# Patient Record
Sex: FEMALE | Race: WHITE | Employment: FULL TIME | URBAN - METROPOLITAN AREA
[De-identification: names, ages, dates, MRNs, and addresses within clinical notes are randomized per-mention and may not be internally consistent; named-entity substitution may affect disease eponyms.]

---

## 2017-01-27 RX ORDER — VENLAFAXINE HYDROCHLORIDE 37.5 MG/1
CAPSULE, EXTENDED RELEASE ORAL
Qty: 30 CAPSULE | Refills: 0 | Status: SHIPPED | OUTPATIENT
Start: 2017-01-27 | End: 2017-02-26 | Stop reason: SDUPTHER

## 2017-02-26 RX ORDER — VENLAFAXINE HYDROCHLORIDE 37.5 MG/1
CAPSULE, EXTENDED RELEASE ORAL
Qty: 30 CAPSULE | Refills: 0 | Status: SHIPPED | OUTPATIENT
Start: 2017-02-26 | End: 2017-07-26 | Stop reason: SDUPTHER

## 2017-02-26 RX ORDER — VENLAFAXINE HYDROCHLORIDE 37.5 MG/1
CAPSULE, EXTENDED RELEASE ORAL
Qty: 30 CAPSULE | Refills: 0 | Status: SHIPPED | OUTPATIENT
Start: 2017-02-26 | End: 2017-07-26 | Stop reason: ALTCHOICE

## 2017-04-12 RX ORDER — VENLAFAXINE HYDROCHLORIDE 37.5 MG/1
CAPSULE, EXTENDED RELEASE ORAL
Qty: 30 CAPSULE | Refills: 2 | Status: SHIPPED | OUTPATIENT
Start: 2017-04-12 | End: 2017-07-26 | Stop reason: ALTCHOICE

## 2017-07-24 ENCOUNTER — HOSPITAL ENCOUNTER (OUTPATIENT)
Dept: WOMENS IMAGING | Age: 52
Discharge: HOME | End: 2017-07-24
Attending: FAMILY MEDICINE | Admitting: FAMILY MEDICINE

## 2017-07-24 DIAGNOSIS — Z12.31 VISIT FOR SCREENING MAMMOGRAM: ICD-10-CM

## 2017-07-26 ENCOUNTER — OFFICE VISIT (OUTPATIENT)
Dept: FAMILY MEDICINE CLINIC | Age: 52
End: 2017-07-26

## 2017-07-26 VITALS
WEIGHT: 172 LBS | BODY MASS INDEX: 24.08 KG/M2 | HEIGHT: 71 IN | SYSTOLIC BLOOD PRESSURE: 118 MMHG | DIASTOLIC BLOOD PRESSURE: 72 MMHG

## 2017-07-26 DIAGNOSIS — Z82.61 FAMILY HISTORY OF RHEUMATOID ARTHRITIS: ICD-10-CM

## 2017-07-26 DIAGNOSIS — R53.83 FATIGUE, UNSPECIFIED TYPE: ICD-10-CM

## 2017-07-26 DIAGNOSIS — M13.0 POLYARTHRITIS: Primary | ICD-10-CM

## 2017-07-26 LAB
A/G RATIO: 1.8 (ref 1.1–2.2)
ALBUMIN SERPL-MCNC: 4.4 G/DL (ref 3.4–5)
ALP BLD-CCNC: 71 U/L (ref 40–129)
ALT SERPL-CCNC: 10 U/L (ref 10–40)
ANION GAP SERPL CALCULATED.3IONS-SCNC: 13 MMOL/L (ref 3–16)
AST SERPL-CCNC: 14 U/L (ref 15–37)
BILIRUB SERPL-MCNC: 0.6 MG/DL (ref 0–1)
BUN BLDV-MCNC: 13 MG/DL (ref 7–20)
CALCIUM SERPL-MCNC: 9.4 MG/DL (ref 8.3–10.6)
CHLORIDE BLD-SCNC: 102 MMOL/L (ref 99–110)
CO2: 26 MMOL/L (ref 21–32)
CREAT SERPL-MCNC: 0.6 MG/DL (ref 0.6–1.1)
GFR AFRICAN AMERICAN: >60
GFR NON-AFRICAN AMERICAN: >60
GLOBULIN: 2.4 G/DL
GLUCOSE BLD-MCNC: 85 MG/DL (ref 70–99)
HCT VFR BLD CALC: 41.2 % (ref 36–48)
HEMOGLOBIN: 14.2 G/DL (ref 12–16)
MCH RBC QN AUTO: 30.9 PG (ref 26–34)
MCHC RBC AUTO-ENTMCNC: 34.5 G/DL (ref 31–36)
MCV RBC AUTO: 89.7 FL (ref 80–100)
PDW BLD-RTO: 12.7 % (ref 12.4–15.4)
PLATELET # BLD: 193 K/UL (ref 135–450)
PMV BLD AUTO: 9.3 FL (ref 5–10.5)
POTASSIUM SERPL-SCNC: 4.4 MMOL/L (ref 3.5–5.1)
RBC # BLD: 4.59 M/UL (ref 4–5.2)
RHEUMATOID FACTOR: 41 IU/ML
SEDIMENTATION RATE, ERYTHROCYTE: 10 MM/HR (ref 0–30)
SODIUM BLD-SCNC: 141 MMOL/L (ref 136–145)
TOTAL PROTEIN: 6.8 G/DL (ref 6.4–8.2)
TSH SERPL DL<=0.05 MIU/L-ACNC: 2.13 UIU/ML (ref 0.27–4.2)
WBC # BLD: 3.9 K/UL (ref 4–11)

## 2017-07-26 PROCEDURE — 99213 OFFICE O/P EST LOW 20 MIN: CPT | Performed by: FAMILY MEDICINE

## 2017-07-26 PROCEDURE — 36415 COLL VENOUS BLD VENIPUNCTURE: CPT | Performed by: FAMILY MEDICINE

## 2017-07-26 RX ORDER — DESVENLAFAXINE 50 MG/1
TABLET, EXTENDED RELEASE ORAL
Qty: 30 TABLET | Refills: 5 | Status: SHIPPED | OUTPATIENT
Start: 2017-07-26 | End: 2018-01-16 | Stop reason: ALTCHOICE

## 2017-07-26 ASSESSMENT — ENCOUNTER SYMPTOMS: RESPIRATORY NEGATIVE: 1

## 2017-07-27 LAB
ANA INTERPRETATION: NORMAL
ANTI-NUCLEAR ANTIBODY (ANA): NEGATIVE

## 2018-01-16 ENCOUNTER — OFFICE VISIT (OUTPATIENT)
Dept: FAMILY MEDICINE CLINIC | Age: 53
End: 2018-01-16

## 2018-01-16 VITALS
DIASTOLIC BLOOD PRESSURE: 70 MMHG | HEIGHT: 71 IN | TEMPERATURE: 98.2 F | BODY MASS INDEX: 22.15 KG/M2 | SYSTOLIC BLOOD PRESSURE: 110 MMHG | WEIGHT: 158.2 LBS

## 2018-01-16 DIAGNOSIS — J40 BRONCHITIS: Primary | ICD-10-CM

## 2018-01-16 PROCEDURE — 99213 OFFICE O/P EST LOW 20 MIN: CPT | Performed by: FAMILY MEDICINE

## 2018-01-16 RX ORDER — METHOTREXATE SODIUM 25 MG/ML
INJECTION, SOLUTION INTRA-ARTERIAL; INTRAMUSCULAR; INTRAVENOUS
Refills: 3 | COMMUNITY
Start: 2017-12-01 | End: 2022-08-02 | Stop reason: ALTCHOICE

## 2018-01-16 RX ORDER — VENLAFAXINE HYDROCHLORIDE 37.5 MG/1
CAPSULE, EXTENDED RELEASE ORAL
COMMUNITY
Start: 2017-06-23 | End: 2022-08-02

## 2018-01-16 RX ORDER — HYDROXYCHLOROQUINE SULFATE 200 MG/1
TABLET, FILM COATED ORAL
Refills: 3 | COMMUNITY
Start: 2018-01-08 | End: 2022-08-02 | Stop reason: ALTCHOICE

## 2018-01-16 RX ORDER — AZITHROMYCIN 250 MG/1
TABLET, FILM COATED ORAL
Qty: 1 PACKET | Refills: 0 | Status: SHIPPED | OUTPATIENT
Start: 2018-01-16 | End: 2018-01-26

## 2018-01-16 RX ORDER — CALCIUM CARBONATE 500(1250)
TABLET ORAL
Refills: 5 | COMMUNITY
Start: 2017-11-29

## 2018-01-16 RX ORDER — PROMETHAZINE HYDROCHLORIDE AND CODEINE PHOSPHATE 6.25; 1 MG/5ML; MG/5ML
SYRUP ORAL
Qty: 180 ML | Refills: 0 | Status: SHIPPED | OUTPATIENT
Start: 2018-01-16 | End: 2018-02-15

## 2018-01-16 RX ORDER — ACETAMINOPHEN 160 MG
TABLET,DISINTEGRATING ORAL
Refills: 3 | COMMUNITY
Start: 2017-12-01

## 2018-01-16 RX ORDER — NEEDLES, SAFETY 22GX1 1/2"
NEEDLE, DISPOSABLE MISCELLANEOUS
Refills: 0 | COMMUNITY
Start: 2017-12-01

## 2018-01-16 RX ORDER — PREDNISONE 1 MG/1
TABLET ORAL
Refills: 2 | COMMUNITY
Start: 2018-01-08 | End: 2018-07-25 | Stop reason: ALTCHOICE

## 2018-01-16 RX ORDER — DESVENLAFAXINE 50 MG/1
50 TABLET, EXTENDED RELEASE ORAL
COMMUNITY
End: 2018-07-28 | Stop reason: SDUPTHER

## 2018-01-16 RX ORDER — FOLIC ACID 1 MG/1
TABLET ORAL
Refills: 5 | COMMUNITY
Start: 2017-12-24 | End: 2022-08-02 | Stop reason: ALTCHOICE

## 2018-01-16 ASSESSMENT — ENCOUNTER SYMPTOMS
COUGH: 1
SORE THROAT: 1
SHORTNESS OF BREATH: 1
HEMOPTYSIS: 0
HEARTBURN: 0

## 2018-01-18 ENCOUNTER — TELEPHONE (OUTPATIENT)
Dept: FAMILY MEDICINE CLINIC | Age: 53
End: 2018-01-18

## 2018-01-18 RX ORDER — SULFACETAMIDE SODIUM 100 MG/ML
2 SOLUTION/ DROPS OPHTHALMIC 4 TIMES DAILY
Qty: 5 ML | Refills: 0 | Status: SHIPPED | OUTPATIENT
Start: 2018-01-18 | End: 2018-01-28

## 2018-01-18 NOTE — TELEPHONE ENCOUNTER
Patient called stating that she saw the school nurse and was informed that she has the pink eye. She wants to know if something could be called into her local pharmacy for it?   Contact patient at [address] [place] [address] [address] [facility], [address] [place] [address],First Floor - F [phone]

## 2018-03-14 ENCOUNTER — TELEPHONE (OUTPATIENT)
Dept: FAMILY MEDICINE CLINIC | Age: 53
End: 2018-03-14

## 2018-06-19 RX ORDER — DESVENLAFAXINE 50 MG/1
TABLET, EXTENDED RELEASE ORAL
Qty: 30 TABLET | Refills: 0 | Status: SHIPPED | OUTPATIENT
Start: 2018-06-19 | End: 2018-07-25 | Stop reason: SDUPTHER

## 2018-07-25 ENCOUNTER — HOSPITAL ENCOUNTER (OUTPATIENT)
Dept: WOMENS IMAGING | Age: 53
Discharge: HOME | End: 2018-07-25
Attending: OBSTETRICS & GYNECOLOGY | Admitting: OBSTETRICS & GYNECOLOGY

## 2018-07-25 ENCOUNTER — OFFICE VISIT (OUTPATIENT)
Dept: FAMILY MEDICINE CLINIC | Age: 53
End: 2018-07-25

## 2018-07-25 VITALS
DIASTOLIC BLOOD PRESSURE: 74 MMHG | WEIGHT: 157 LBS | HEIGHT: 71 IN | BODY MASS INDEX: 21.98 KG/M2 | SYSTOLIC BLOOD PRESSURE: 120 MMHG

## 2018-07-25 DIAGNOSIS — Z00.00 WELL ADULT EXAM: Primary | ICD-10-CM

## 2018-07-25 DIAGNOSIS — Z12.31 VISIT FOR SCREENING MAMMOGRAM: ICD-10-CM

## 2018-07-25 LAB
A/G RATIO: 2.2 (ref 1.1–2.2)
ALBUMIN SERPL-MCNC: 4.8 G/DL (ref 3.4–5)
ALP BLD-CCNC: 67 U/L (ref 40–129)
ALT SERPL-CCNC: 14 U/L (ref 10–40)
ANION GAP SERPL CALCULATED.3IONS-SCNC: 14 MMOL/L (ref 3–16)
AST SERPL-CCNC: 17 U/L (ref 15–37)
BILIRUB SERPL-MCNC: 0.8 MG/DL (ref 0–1)
BUN BLDV-MCNC: 13 MG/DL (ref 7–20)
CALCIUM SERPL-MCNC: 10.4 MG/DL (ref 8.3–10.6)
CHLORIDE BLD-SCNC: 103 MMOL/L (ref 99–110)
CHOLESTEROL, TOTAL: 145 MG/DL (ref 0–199)
CO2: 28 MMOL/L (ref 21–32)
CREAT SERPL-MCNC: 0.7 MG/DL (ref 0.6–1.1)
GFR AFRICAN AMERICAN: >60
GFR NON-AFRICAN AMERICAN: >60
GLOBULIN: 2.2 G/DL
GLUCOSE BLD-MCNC: 80 MG/DL (ref 70–99)
HDLC SERPL-MCNC: 81 MG/DL (ref 40–60)
LDL CHOLESTEROL CALCULATED: 52 MG/DL
POTASSIUM SERPL-SCNC: 4.8 MMOL/L (ref 3.5–5.1)
SODIUM BLD-SCNC: 145 MMOL/L (ref 136–145)
TOTAL PROTEIN: 7 G/DL (ref 6.4–8.2)
TRIGL SERPL-MCNC: 60 MG/DL (ref 0–150)
TSH SERPL DL<=0.05 MIU/L-ACNC: 2.03 UIU/ML (ref 0.27–4.2)
VITAMIN D 25-HYDROXY: 34.8 NG/ML
VLDLC SERPL CALC-MCNC: 12 MG/DL

## 2018-07-25 PROCEDURE — 36415 COLL VENOUS BLD VENIPUNCTURE: CPT | Performed by: FAMILY MEDICINE

## 2018-07-25 PROCEDURE — 99396 PREV VISIT EST AGE 40-64: CPT | Performed by: FAMILY MEDICINE

## 2018-07-25 ASSESSMENT — ENCOUNTER SYMPTOMS
GASTROINTESTINAL NEGATIVE: 1
RESPIRATORY NEGATIVE: 1

## 2018-07-25 ASSESSMENT — PATIENT HEALTH QUESTIONNAIRE - PHQ9
SUM OF ALL RESPONSES TO PHQ QUESTIONS 1-9: 0
2. FEELING DOWN, DEPRESSED OR HOPELESS: 0
SUM OF ALL RESPONSES TO PHQ9 QUESTIONS 1 & 2: 0
1. LITTLE INTEREST OR PLEASURE IN DOING THINGS: 0

## 2018-07-25 NOTE — PROGRESS NOTES
Pupils are equal, round, and reactive to light. Left eye exhibits no discharge. Neck: Normal range of motion. No thyromegaly present. Cardiovascular: Normal rate, regular rhythm, normal heart sounds and intact distal pulses. No murmur heard. Pulmonary/Chest: Effort normal. No respiratory distress. She has no wheezes. She has no rales. Abdominal: Soft. She exhibits no distension and no mass. There is no guarding. Lymphadenopathy:     She has no cervical adenopathy. Neurological: She is alert and oriented to person, place, and time. She has normal reflexes. Skin: Skin is warm and dry. No rash noted. No erythema. Psychiatric: She has a normal mood and affect. Her behavior is normal. Judgment and thought content normal.   Nursing note and vitals reviewed. Assessment:      Assessment/plan;  Luis A Alvarado was seen today for annual exam and medication check. Diagnoses and all orders for this visit:    Well adult exam  -     TSH without Reflex  -     Lipid Panel  -     Comprehensive Metabolic Panel  -     Vitamin D 25 Hydroxy      Return if symptoms worsen or fail to improve.

## 2018-07-28 RX ORDER — DESVENLAFAXINE 50 MG/1
50 TABLET, EXTENDED RELEASE ORAL DAILY
Qty: 30 TABLET | Refills: 2 | Status: SHIPPED | OUTPATIENT
Start: 2018-07-28 | End: 2018-11-10 | Stop reason: SDUPTHER

## 2018-12-15 RX ORDER — DESVENLAFAXINE 50 MG/1
TABLET, EXTENDED RELEASE ORAL
Qty: 30 TABLET | Refills: 0 | Status: SHIPPED | OUTPATIENT
Start: 2018-12-15 | End: 2019-01-21 | Stop reason: SDUPTHER

## 2019-01-21 RX ORDER — DESVENLAFAXINE 50 MG/1
TABLET, EXTENDED RELEASE ORAL
Qty: 30 TABLET | Refills: 2 | Status: SHIPPED | OUTPATIENT
Start: 2019-01-21 | End: 2019-04-26 | Stop reason: SDUPTHER

## 2019-04-26 RX ORDER — DESVENLAFAXINE 50 MG/1
TABLET, EXTENDED RELEASE ORAL
Qty: 30 TABLET | Refills: 1 | Status: SHIPPED | OUTPATIENT
Start: 2019-04-26 | End: 2019-07-04 | Stop reason: SDUPTHER

## 2019-07-04 RX ORDER — DESVENLAFAXINE 50 MG/1
TABLET, EXTENDED RELEASE ORAL
Qty: 30 TABLET | Refills: 0 | Status: SHIPPED | OUTPATIENT
Start: 2019-07-04 | End: 2019-08-02 | Stop reason: SDUPTHER

## 2019-07-22 ENCOUNTER — OFFICE VISIT (OUTPATIENT)
Dept: FAMILY MEDICINE CLINIC | Age: 54
End: 2019-07-22
Payer: COMMERCIAL

## 2019-07-22 VITALS
BODY MASS INDEX: 22.65 KG/M2 | HEIGHT: 71 IN | WEIGHT: 161.8 LBS | SYSTOLIC BLOOD PRESSURE: 110 MMHG | DIASTOLIC BLOOD PRESSURE: 72 MMHG

## 2019-07-22 DIAGNOSIS — Z00.00 WELL ADULT EXAM: Primary | ICD-10-CM

## 2019-07-22 PROCEDURE — 99396 PREV VISIT EST AGE 40-64: CPT | Performed by: FAMILY MEDICINE

## 2019-07-22 RX ORDER — ADALIMUMAB 40MG/0.4ML
KIT SUBCUTANEOUS
COMMUNITY
Start: 2019-07-12 | End: 2020-08-10 | Stop reason: ALTCHOICE

## 2019-07-22 ASSESSMENT — PATIENT HEALTH QUESTIONNAIRE - PHQ9
SUM OF ALL RESPONSES TO PHQ9 QUESTIONS 1 & 2: 0
SUM OF ALL RESPONSES TO PHQ QUESTIONS 1-9: 0
SUM OF ALL RESPONSES TO PHQ QUESTIONS 1-9: 0
1. LITTLE INTEREST OR PLEASURE IN DOING THINGS: 0
2. FEELING DOWN, DEPRESSED OR HOPELESS: 0

## 2019-07-22 ASSESSMENT — ENCOUNTER SYMPTOMS
GASTROINTESTINAL NEGATIVE: 1
RESPIRATORY NEGATIVE: 1

## 2019-08-02 RX ORDER — DESVENLAFAXINE 50 MG/1
TABLET, EXTENDED RELEASE ORAL
Qty: 30 TABLET | Refills: 0 | Status: SHIPPED | OUTPATIENT
Start: 2019-08-02 | End: 2019-09-14 | Stop reason: SDUPTHER

## 2020-01-06 RX ORDER — DESVENLAFAXINE 50 MG/1
TABLET, EXTENDED RELEASE ORAL
Qty: 30 TABLET | Refills: 1 | Status: SHIPPED | OUTPATIENT
Start: 2020-01-06 | End: 2020-03-23

## 2020-08-10 ENCOUNTER — OFFICE VISIT (OUTPATIENT)
Dept: FAMILY MEDICINE CLINIC | Age: 55
End: 2020-08-10
Payer: COMMERCIAL

## 2020-08-10 VITALS
WEIGHT: 150 LBS | HEIGHT: 71 IN | DIASTOLIC BLOOD PRESSURE: 68 MMHG | SYSTOLIC BLOOD PRESSURE: 102 MMHG | TEMPERATURE: 98.6 F | BODY MASS INDEX: 21 KG/M2

## 2020-08-10 PROCEDURE — 99213 OFFICE O/P EST LOW 20 MIN: CPT | Performed by: FAMILY MEDICINE

## 2020-08-10 PROCEDURE — 96372 THER/PROPH/DIAG INJ SC/IM: CPT | Performed by: FAMILY MEDICINE

## 2020-08-10 RX ORDER — METHYLPREDNISOLONE 4 MG/1
TABLET ORAL
Qty: 1 KIT | Refills: 0 | Status: SHIPPED | OUTPATIENT
Start: 2020-08-10 | End: 2020-08-16

## 2020-08-10 RX ORDER — METHYLPREDNISOLONE ACETATE 80 MG/ML
80 INJECTION, SUSPENSION INTRA-ARTICULAR; INTRALESIONAL; INTRAMUSCULAR; SOFT TISSUE ONCE
Status: CANCELLED | OUTPATIENT
Start: 2020-08-10 | End: 2020-08-10

## 2020-08-10 RX ORDER — METHYLPREDNISOLONE ACETATE 80 MG/ML
80 INJECTION, SUSPENSION INTRA-ARTICULAR; INTRALESIONAL; INTRAMUSCULAR; SOFT TISSUE ONCE
Status: COMPLETED | OUTPATIENT
Start: 2020-08-10 | End: 2020-08-10

## 2020-08-10 RX ADMIN — METHYLPREDNISOLONE ACETATE 80 MG: 80 INJECTION, SUSPENSION INTRA-ARTICULAR; INTRALESIONAL; INTRAMUSCULAR; SOFT TISSUE at 11:58

## 2020-08-10 ASSESSMENT — ENCOUNTER SYMPTOMS
RHINORRHEA: 0
VOMITING: 0
NAIL CHANGES: 0
COUGH: 0
EYE PAIN: 0
DIARRHEA: 0
SORE THROAT: 0
SHORTNESS OF BREATH: 0

## 2020-08-10 ASSESSMENT — PATIENT HEALTH QUESTIONNAIRE - PHQ9
SUM OF ALL RESPONSES TO PHQ9 QUESTIONS 1 & 2: 0
1. LITTLE INTEREST OR PLEASURE IN DOING THINGS: 0
SUM OF ALL RESPONSES TO PHQ QUESTIONS 1-9: 0
SUM OF ALL RESPONSES TO PHQ QUESTIONS 1-9: 0
2. FEELING DOWN, DEPRESSED OR HOPELESS: 0

## 2020-08-10 NOTE — PROGRESS NOTES
11\" (1.803 m)     Body mass index is 20.92 kg/m². Wt Readings from Last 3 Encounters:   08/10/20 150 lb (68 kg)   07/22/19 161 lb 12.8 oz (73.4 kg)   07/25/18 157 lb (71.2 kg)     BP Readings from Last 3 Encounters:   08/10/20 102/68   07/22/19 110/72   07/25/18 120/74       Objective:   Physical Exam  Constitutional:       General: She is not in acute distress. Appearance: She is well-developed. HENT:      Head: Normocephalic. Skin:     Findings: Rash (bilat arms and posterior neck with red raised vesicles ) present. Neurological:      Mental Status: She is alert and oriented to person, place, and time. Psychiatric:         Behavior: Behavior normal.         Thought Content: Thought content normal.         Judgment: Judgment normal.         Assessment:      Assessment/plan;  Luli Mario was seen today for poison ivy. Diagnoses and all orders for this visit:    Poison ivy    Other orders  -     methylPREDNISolone acetate (DEPO-MEDROL) injection 80 mg  -     methylPREDNISolone (MEDROL DOSEPACK) 4 MG tablet; Take by mouth.       Call if not clearing up   Cheral Pallas, DO

## 2020-12-15 RX ORDER — DESVENLAFAXINE 50 MG/1
TABLET, EXTENDED RELEASE ORAL
Qty: 30 TABLET | Refills: 0 | Status: SHIPPED | OUTPATIENT
Start: 2020-12-15 | End: 2021-02-07

## 2020-12-29 ENCOUNTER — HOSPITAL ENCOUNTER (EMERGENCY)
Age: 55
Discharge: HOME | End: 2020-12-29
Payer: COMMERCIAL

## 2020-12-29 ENCOUNTER — TELEPHONE (OUTPATIENT)
Dept: FAMILY MEDICINE CLINIC | Age: 55
End: 2020-12-29

## 2020-12-29 VITALS
RESPIRATION RATE: 9 BRPM | SYSTOLIC BLOOD PRESSURE: 116 MMHG | HEART RATE: 84 BPM | HEIGHT: 71 IN | TEMPERATURE: 98.8 F | DIASTOLIC BLOOD PRESSURE: 64 MMHG | WEIGHT: 151.46 LBS | OXYGEN SATURATION: 98 % | BODY MASS INDEX: 21.2 KG/M2

## 2020-12-29 LAB
A/G RATIO: 1.8 (ref 1.1–2.2)
ALBUMIN SERPL-MCNC: 4.4 G/DL (ref 3.4–5)
ALP BLD-CCNC: 65 U/L (ref 40–129)
ALT SERPL-CCNC: 13 U/L (ref 10–40)
ANION GAP SERPL CALCULATED.3IONS-SCNC: 11 MMOL/L (ref 3–16)
AST SERPL-CCNC: 19 U/L (ref 15–37)
BASOPHILS ABSOLUTE: 0 K/UL (ref 0–0.2)
BASOPHILS RELATIVE PERCENT: 0.8 %
BILIRUB SERPL-MCNC: 0.6 MG/DL (ref 0–1)
BUN BLDV-MCNC: 14 MG/DL (ref 7–20)
CALCIUM SERPL-MCNC: 9.3 MG/DL (ref 8.3–10.6)
CHLORIDE BLD-SCNC: 103 MMOL/L (ref 99–110)
CO2: 27 MMOL/L (ref 21–32)
CREAT SERPL-MCNC: 0.6 MG/DL (ref 0.6–1.1)
D DIMER: 260 NG/ML DDU (ref 0–229)
EOSINOPHILS ABSOLUTE: 0.1 K/UL (ref 0–0.6)
EOSINOPHILS RELATIVE PERCENT: 1.4 %
GFR AFRICAN AMERICAN: >60
GFR NON-AFRICAN AMERICAN: >60
GLOBULIN: 2.5 G/DL
GLUCOSE BLD-MCNC: 114 MG/DL (ref 70–99)
HCT VFR BLD CALC: 42 % (ref 36–48)
HEMOGLOBIN: 14 G/DL (ref 12–16)
LYMPHOCYTES ABSOLUTE: 1.6 K/UL (ref 1–5.1)
LYMPHOCYTES RELATIVE PERCENT: 35.6 %
MCH RBC QN AUTO: 30.1 PG (ref 26–34)
MCHC RBC AUTO-ENTMCNC: 33.2 G/DL (ref 31–36)
MCV RBC AUTO: 90.8 FL (ref 80–100)
MONOCYTES ABSOLUTE: 0.3 K/UL (ref 0–1.3)
MONOCYTES RELATIVE PERCENT: 6.3 %
NEUTROPHILS ABSOLUTE: 2.6 K/UL (ref 1.7–7.7)
NEUTROPHILS RELATIVE PERCENT: 55.9 %
PDW BLD-RTO: 12.4 % (ref 12.4–15.4)
PLATELET # BLD: 216 K/UL (ref 135–450)
PMV BLD AUTO: 8.3 FL (ref 5–10.5)
POTASSIUM SERPL-SCNC: 3.9 MMOL/L (ref 3.5–5.1)
RBC # BLD: 4.63 M/UL (ref 4–5.2)
SODIUM BLD-SCNC: 141 MMOL/L (ref 136–145)
TOTAL PROTEIN: 6.9 G/DL (ref 6.4–8.2)
WBC # BLD: 4.6 K/UL (ref 4–11)

## 2020-12-29 PROCEDURE — 85379 FIBRIN DEGRADATION QUANT: CPT

## 2020-12-29 PROCEDURE — 85025 COMPLETE CBC W/AUTO DIFF WBC: CPT

## 2020-12-29 PROCEDURE — 99283 EMERGENCY DEPT VISIT LOW MDM: CPT

## 2020-12-29 PROCEDURE — 80053 COMPREHEN METABOLIC PANEL: CPT

## 2020-12-29 ASSESSMENT — PAIN - FUNCTIONAL ASSESSMENT: PAIN_FUNCTIONAL_ASSESSMENT: PREVENTS OR INTERFERES SOME ACTIVE ACTIVITIES AND ADLS

## 2020-12-29 ASSESSMENT — PAIN DESCRIPTION - ORIENTATION: ORIENTATION: RIGHT

## 2020-12-29 ASSESSMENT — PAIN DESCRIPTION - PROGRESSION: CLINICAL_PROGRESSION: NOT CHANGED

## 2020-12-29 ASSESSMENT — PAIN DESCRIPTION - ONSET: ONSET: ON-GOING

## 2020-12-29 ASSESSMENT — PAIN DESCRIPTION - DESCRIPTORS: DESCRIPTORS: ACHING

## 2020-12-29 ASSESSMENT — PAIN SCALES - GENERAL: PAINLEVEL_OUTOF10: 8

## 2020-12-29 ASSESSMENT — PAIN DESCRIPTION - LOCATION: LOCATION: LEG

## 2020-12-29 ASSESSMENT — PAIN DESCRIPTION - FREQUENCY: FREQUENCY: CONTINUOUS

## 2020-12-29 ASSESSMENT — PAIN DESCRIPTION - PAIN TYPE: TYPE: ACUTE PAIN

## 2020-12-29 NOTE — TELEPHONE ENCOUNTER
Patient called in stating her mother  today and there was a home care nurse that looked at this patients leg due to it being so swollen and painful she stated it was her right leg I tried to find  she was already gone I spoke with Tavon Menjivar and he advised me to tell the patient to go to the er to be evaluated the patient states the nurse told her positive homansign

## 2020-12-30 ENCOUNTER — TELEPHONE (OUTPATIENT)
Dept: FAMILY MEDICINE CLINIC | Age: 55
End: 2020-12-30

## 2020-12-30 ENCOUNTER — HOSPITAL ENCOUNTER (OUTPATIENT)
Dept: VASCULAR LAB | Age: 55
Discharge: HOME | End: 2020-12-30
Payer: COMMERCIAL

## 2020-12-30 PROCEDURE — 93971 EXTREMITY STUDY: CPT

## 2020-12-30 ASSESSMENT — ENCOUNTER SYMPTOMS
NAUSEA: 0
SHORTNESS OF BREATH: 0
COUGH: 0
BACK PAIN: 0
ABDOMINAL PAIN: 0
EYE PAIN: 0
SORE THROAT: 0
VOMITING: 0

## 2020-12-30 NOTE — TELEPHONE ENCOUNTER
Please let pt know that her leg showed no blood clot but a bakers cyst which is arthritic cyst behind her knee  Needs to see ortho  [facility] if she needs referral

## 2020-12-30 NOTE — TELEPHONE ENCOUNTER
Dustin guerrero--prelim results    Pt is negative for DVT- h/o she has a very large bakers cyst from knee down to calf

## 2020-12-30 NOTE — ED PROVIDER NOTES
[address] [facility]        Pt Name: Mey Hancock  MRN: [id number]  [place] 1965  Date of evaluation: 2020  Provider: ARIADNA Bateman  PCP: Simi Milian DO    GINO. I have evaluated this patient. My supervising physician was available for consultation. CHIEF COMPLAINT       Chief Complaint   Patient presents with    Leg Pain     right lower leg pain. swelling and pain. onset 2 days ago. denies recent injury       HISTORY OF PRESENT ILLNESS   (Location, Timing/Onset, Context/Setting, Quality, Duration, Modifying Factors, Severity, Associated Signs and Symptoms)  Note limiting factors. Mey Hancock is a 63 y.o. female  presents emergency room for right leg pain. Patient reports that over the last 2 days she has felt like she has had a charley horse of her right calf. Her mother recently passed away and they had a  for her today. She spoke with a nurse, who did the [facility] test and recommended she come to the emergency room to assess for DVT. Patient denies chest pain, shortness of breath, exertional symptoms, hemoptysis, prior PE or DVT, malignancy, hormone use, back pain, erythema, fever, numbness, weakness. Nursing Notes were all reviewed and agreed with or any disagreements were addressed in the HPI. REVIEW OF SYSTEMS    (2-9 systems for level 4, 10 or more for level 5)     Review of Systems   Constitutional: Negative for fever. HENT: Negative for sore throat. Eyes: Negative for pain and visual disturbance. Respiratory: Negative for cough and shortness of breath. Cardiovascular: Positive for leg swelling. Negative for chest pain. Gastrointestinal: Negative for abdominal pain, nausea and vomiting. Genitourinary: Negative for dysuria and frequency. Musculoskeletal: Negative for back pain and neck pain. Skin: Negative for rash.    Neurological: Negative for dizziness, weakness, SCREENINGS             PHYSICAL EXAM    (up to 7 for level 4, 8 or more for level 5)     ED Triage Vitals   BP Temp Temp Source Pulse Resp SpO2 Height Weight   12/29/20 1636 12/29/20 1636 12/29/20 1636 12/29/20 1636 12/29/20 1636 12/29/20 1636 12/29/20 1637 12/29/20 1637   120/86 98.8 °F (37.1 °C) Tympanic 76 18 99 % 5' 11\" (1.803 m) 151 lb 7.3 oz (68.7 kg)       Physical Exam  Vitals signs reviewed. Constitutional:       Appearance: She is not diaphoretic. HENT:      Nose: No congestion or rhinorrhea. Eyes:      General: No scleral icterus. Conjunctiva/sclera: Conjunctivae normal.   Neck:      Musculoskeletal: Normal range of motion and neck supple. Cardiovascular:      Rate and Rhythm: Normal rate and regular rhythm. Pulses: Normal pulses. Heart sounds: Normal heart sounds. No murmur. No friction rub. No gallop. Pulmonary:      Effort: Pulmonary effort is normal. No respiratory distress. Breath sounds: Normal breath sounds. No stridor. No wheezing, rhonchi or rales. Musculoskeletal: Normal range of motion. Comments: Minimal right calf swelling, mild tenderness. No erythema, crepitus, pain out of proportion. 2+ DP and PT pulses. Cap refill less than 2 seconds. Distal motor and sensory intact. No midline or bony cervical, thoracic, lumbar tenderness to palpation. Skin:     General: Skin is warm and dry. Neurological:      General: No focal deficit present. Mental Status: She is alert and oriented to person, place, and time. Sensory: No sensory deficit. Motor: No weakness.       Gait: Gait normal.   Psychiatric:         Mood and Affect: Mood normal.         Behavior: Behavior normal.         DIAGNOSTIC RESULTS   LABS:    Labs Reviewed   COMPREHENSIVE METABOLIC PANEL - Abnormal; Notable for the following components:       Result Value    Glucose 114 (*)     All other components within normal limits    Narrative:     Performed at:  [facility]  [address] [place] [address]   Phone [phone]   D-DIMER, QUANTITATIVE - Abnormal; Notable for the following components:    D-Dimer, Quant 260 (*)     All other components within normal limits    Narrative:     Performed at:  [facility]  [address], [facility] [place] [address]   Phone [phone]   CBC WITH AUTO DIFFERENTIAL    Narrative:     Performed at:  [facility]  [address] [facility] [place] [address]   Phone [phone]       All other labs were within normal range or not returned as of this dictation. EKG: All EKG's are interpreted by the Emergency Department Physician in the absence of a cardiologist.  Please see their note for interpretation of EKG. RADIOLOGY:   Non-plain film images such as CT, Ultrasound and MRI are read by the radiologist. Plain radiographic images are visualized and preliminarily interpreted by the ED Provider with the below findings:        Interpretation per the Radiologist below, if available at the time of this note:    VL Extremity Venous Right    (Results Pending)     No results found. PROCEDURES   Unless otherwise noted below, none     Procedures    CRITICAL CARE TIME   N/A    CONSULTS:  None      EMERGENCY DEPARTMENT COURSE and DIFFERENTIAL DIAGNOSIS/MDM:   Vitals:    Vitals:    12/29/20 1637 12/29/20 1830 12/29/20 1900 12/29/20 1930   BP:  111/69 113/60 116/64   Pulse:  72 73 84   Resp:  9     Temp:       TempSrc:       SpO2:  100% 100% 98%   Weight: 151 lb 7.3 oz (68.7 kg)      Height: 5' 11\" (1.803 m)          Patient was given the following medications:  Medications - No data to display        76-year-old female presents emergency room for right calf pain. Low concern for PE as patient does not have chest pain, shortness of breath, tachypnea, tachycardia, hypoxia.   D-dimer less than age-adjusted threshold, patient amenable to foregoing anticoagulation until ultrasound imaging. Unfortunately, ultrasound not available at this time but can be scheduled for tomorrow. Venous Doppler order placed for patient. No signs of infection, neurovascular deficit on exam.  Directed to follow-up tomorrow for venous ultrasound, and to follow-up with primary care provider in 2 days. Instructed to return to the emergency room sooner for new or worsening symptoms including but not limited to chest pain, shortness of breath, coughing blood, fever, any other symptoms she is concerned about. Verbal and written discharge instructions and return precautions given. FINAL IMPRESSION      1.  Right leg pain          DISPOSITION/PLAN   DISPOSITION Decision To Discharge 12/29/2020 07:11:22 PM      PATIENT REFERREDTO:  Rosette Chandler DO  [address] [address]  [phone]    Call in 2 days        DISCHARGE MEDICATIONS:  Discharge Medication List as of 12/29/2020  7:23 PM          DISCONTINUED MEDICATIONS:  Discharge Medication List as of 12/29/2020  7:23 PM                 (Please note that portions of this note were completed with a voice recognition program.  Efforts were made to edit the dictations but occasionally words are mis-transcribed.)    ARIADNA Parra (electronically signed)         ARIADNA Parra  12/30/20 2122

## 2020-12-30 NOTE — TELEPHONE ENCOUNTER
Patient has an appt today to have a venous doppler today at 11. Patient states she still has pain but her swelling has gone down.

## 2021-01-05 ENCOUNTER — TELEPHONE (OUTPATIENT)
Dept: ORTHOPEDIC SURGERY | Age: 56
End: 2021-01-05

## 2021-01-05 ENCOUNTER — OFFICE VISIT (OUTPATIENT)
Dept: ORTHOPEDIC SURGERY | Age: 56
End: 2021-01-05
Payer: COMMERCIAL

## 2021-01-05 VITALS — BODY MASS INDEX: 21 KG/M2 | TEMPERATURE: 97.5 F | HEIGHT: 71 IN | WEIGHT: 150 LBS

## 2021-01-05 DIAGNOSIS — M25.561 RIGHT KNEE PAIN, UNSPECIFIED CHRONICITY: ICD-10-CM

## 2021-01-05 DIAGNOSIS — M79.661 RIGHT CALF PAIN: Primary | ICD-10-CM

## 2021-01-05 PROCEDURE — 99243 OFF/OP CNSLTJ NEW/EST LOW 30: CPT | Performed by: ORTHOPAEDIC SURGERY

## 2021-01-05 SDOH — HEALTH STABILITY: MENTAL HEALTH: HOW OFTEN DO YOU HAVE A DRINK CONTAINING ALCOHOL?: NOT ASKED

## 2021-01-05 NOTE — PROGRESS NOTES
hydroxychloroquine (PLAQUENIL) 200 MG tablet   3    folic acid (FOLVITE) 1 MG tablet   5    Cholecalciferol (VITAMIN D3) 2000 units CAPS TK 1 C PO D  3    calcium elemental (OSCAL) 500 MG TABS tablet   5    B-D TB SYRINGE 1CC/27GX1/2\" 27G X 1/2\" 1 ML MISC TO BE USED WITH METHOTREXATE  0    venlafaxine (EFFEXOR XR) 37.5 MG extended release capsule TK 1 C PO D       No current facility-administered medications for this visit.         Past Medical History:   Diagnosis Date    Anxiety     Carpal tunnel syndrome on right     Dermatitis     History of kidney stones     IBS (irritable bowel syndrome)     Rheumatic disease     Wears glasses         Past Surgical History:   Procedure Laterality Date    CHOLECYSTECTOMY  12-    lap    HYSTERECTOMY      LITHOTRIPSY      VEIN SURGERY Left     vein stripping       Family History   Problem Relation Age of Onset    Diabetes Mother         type 1 diabetes    Heart Disease Father         mild MI       Social History     Socioeconomic History    Marital status:      Spouse name: Murphy Bravo Number of children: Not on file    Years of education: Not on file    Highest education level: Not on file   Occupational History    Occupation: Teacher   Social Needs    Financial resource strain: Not on file    Food insecurity     Worry: Not on file     Inability: Not on file   [employer] needs     Medical: Not on file     Non-medical: Not on file   Tobacco Use    Smoking status: Never Smoker    Smokeless tobacco: Never Used   Substance and Sexual Activity    Alcohol use: Not Currently     Comment: Socially    Drug use: No    Sexual activity: Yes     Partners: Male   Lifestyle    Physical activity     Days per week: Not on file     Minutes per session: Not on file    Stress: Not on file   Relationships    Social connections     Talks on phone: Not on file     Gets together: Not on file     Attends Zoroastrianism service: Not on file     Active member of osteophytes. The anterior compartment is within normal limits with small osteophytes seen. The patella is well-centered within the trochlear groove. There are no loose bodies appreciated. 12/30/2020  Conclusions        Summary        No evidence of deep vein or superficial vein thrombosis involving the right    lower extremity.    Large avascular area extending from the medial knee down into distal calf    which may represent a hematoma.        Signature        ------------------------------------------------------------------    Electronically signed by Magali Barlow MD (Interpreting    physician) on 01/02/2021 at 12:49 PM    ------------------------------------------------------------------         Assessment & Plan:  63 y.o. female who presents with    Diagnosis Orders   1. Right calf pain  MRI TIBIA FIBULA RIGHT WO CONTRAST    with swelling   2. Right knee pain, unspecified chronicity  XR KNEE RIGHT (MIN 4 VIEWS)       No orders of the defined types were placed in this encounter. Thank you Dr Alec Deal for referring Max Beckford to me for evaluation of her right leg/calf pain:      Differential - hematoma, large Baker's cyst, ruptured cyst, medial gastroc tear    Given persistent symptoms despite time and rest, as well as persistent swelling, and negative duplex, I will order an MRI to evaluate. The patient is advised to return once completed so that we may review the images together and to discuss treatment options.     Ice, compression stockings, activity modification in the meantime    Rommel Matthews

## 2021-01-05 NOTE — TELEPHONE ENCOUNTER
I spoke to pt and let her know she may call [facility] to schedule her MRI. I faxed her order to [address] [facility]. She will call us back to make a fu appt with Dr Paul Ann, to go over results.

## 2021-01-08 ENCOUNTER — OFFICE VISIT (OUTPATIENT)
Dept: ORTHOPEDIC SURGERY | Age: 56
End: 2021-01-08
Payer: COMMERCIAL

## 2021-01-08 VITALS — WEIGHT: 152 LBS | BODY MASS INDEX: 21.28 KG/M2 | TEMPERATURE: 97.4 F | HEIGHT: 71 IN

## 2021-01-08 DIAGNOSIS — M71.21 BAKERS CYST, RIGHT: Primary | ICD-10-CM

## 2021-01-08 PROCEDURE — 20610 DRAIN/INJ JOINT/BURSA W/O US: CPT | Performed by: ORTHOPAEDIC SURGERY

## 2021-01-08 PROCEDURE — 99212 OFFICE O/P EST SF 10 MIN: CPT | Performed by: ORTHOPAEDIC SURGERY

## 2021-01-08 RX ORDER — LIDOCAINE HYDROCHLORIDE 10 MG/ML
4 INJECTION, SOLUTION INFILTRATION; PERINEURAL ONCE
Status: COMPLETED | OUTPATIENT
Start: 2021-01-08 | End: 2021-01-08

## 2021-01-08 RX ADMIN — LIDOCAINE HYDROCHLORIDE 4 ML: 10 INJECTION, SOLUTION INFILTRATION; PERINEURAL at 14:26

## 2021-01-08 NOTE — PROGRESS NOTES
ORTHOPAEDIC OFFICE NOTE    Chief Complaint   Patient presents with    Follow-up     Right calf pain. Proscan MRI results.        HPI   1/8/2021  FU right calf pain  Rated 7/10 in severity today  Pain is located medially  Got MRI  Swelling persists  Denies N/T      1/5/2021  63 y.o. female seen in consultation at the request of Tania Araya DO for evaluation of right leg pain    History significant for baseline knee pain, rheumatoid arthritis  Typically on Enbrel and methotrexate    Right leg pain:  Onset early December  Injury/trauma - had a fall onto the right knee couple weeks prior to symptoms, unsure if related  History of symptoms denies previous leg symptoms  Pain is located right posterior calf  Worse with pressure, activity, WB  Better with nothing, has not improved since onset  Associated with swelling, no bruising    Went to the ED and duplex study performed  No evidence of DVT/SVT        Allergies   Allergen Reactions    Amoxicillin Hives        Current Outpatient Medications   Medication Sig Dispense Refill    desvenlafaxine succinate (PRISTIQ) 50 MG TB24 extended release tablet TAKE ONE TABLET BY MOUTH DAILY 30 tablet 0    Etanercept (ENBREL SC) Inject into the skin      methotrexate Sodium (RHEUMATREX) 250 MG/10ML SOLN chemo injection USE 1 ML SUBCUTANEOUSLY UTD  ONCE A WEEK  3    hydroxychloroquine (PLAQUENIL) 200 MG tablet   3    folic acid (FOLVITE) 1 MG tablet   5    Cholecalciferol (VITAMIN D3) 2000 units CAPS TK 1 C PO D  3    calcium elemental (OSCAL) 500 MG TABS tablet   5    B-D TB SYRINGE 1CC/27GX1/2\" 27G X 1/2\" 1 ML MISC TO BE USED WITH METHOTREXATE  0    venlafaxine (EFFEXOR XR) 37.5 MG extended release capsule TK 1 C PO D       Current Facility-Administered Medications   Medication Dose Route Frequency Provider Last Rate Last Admin    lidocaine 1 % injection 4 mL  4 mL Subcutaneous Once Paras Sotelo MD           Past Medical History:   Diagnosis Date    Anxiety     Carpal tunnel syndrome on right     Dermatitis     History of kidney stones     IBS (irritable bowel syndrome)     Rheumatic disease     Wears glasses         Past Surgical History:   Procedure Laterality Date    CHOLECYSTECTOMY  12-    lap    HYSTERECTOMY      LITHOTRIPSY      VEIN SURGERY Left     vein stripping       Family History   Problem Relation Age of Onset    Diabetes Mother         type 1 diabetes    Heart Disease Father         mild MI       Social History     Socioeconomic History    Marital status:      Spouse name: Eladio Bookbinder Number of children: Not on file    Years of education: Not on file    Highest education level: Not on file   Occupational History    Occupation: Teacher   Social Needs    Financial resource strain: Not on file    Food insecurity     Worry: Not on file     Inability: Not on file   [employer] needs     Medical: Not on file     Non-medical: Not on file   Tobacco Use    Smoking status: Never Smoker    Smokeless tobacco: Never Used   Substance and Sexual Activity    Alcohol use: Not Currently     Comment: Socially    Drug use: No    Sexual activity: Yes     Partners: Male   Lifestyle    Physical activity     Days per week: Not on file     Minutes per session: Not on file    Stress: Not on file   Relationships    Social connections     Talks on phone: Not on file     Gets together: Not on file     Attends Church service: Not on file     Active member of club or organization: Not on file     Attends meetings of clubs or organizations: Not on file     Relationship status: Not on file    Intimate partner violence     Fear of current or ex partner: Not on file     Emotionally abused: Not on file     Physically abused: Not on file     Forced sexual activity: Not on file   Other Topics Concern    Not on file   Social History Narrative    Not on file        Vitals:    01/08/21 1038   Temp: 97.4 °F (36.3 °C)   TempSrc: Infrared   Weight: 152 lb (68.9 kg)   Height: 5' 11\" (1.803 m)       Physical Exam  Constitutional - well-groomed, well-nourished, Body mass index is 21.2 kg/m². Cardiovascular - RRR, positive peripheral edema, minimal varicose veins, dorsalis pedis pulse 2+  Skin - no rashes, wounds, or lesions seen on exposed skin  Neurological - SILT SP/DP/T/sural/saphenous nerve distributions; EHL/FHL/TA/GS intact  Right leg - asymmetric right leg swelling compared to left   TTP posteromedially   Palpable knot/swelling   Plantar flexion/dorsiflexion strength 5/5      Imaging:  Images were personally reviewed by myself and discussed with the patient  Right knee 4 views performed previously   - Overall alignment is neutral.    The medial compartment is mildly narrowed with small osteophytes seen. The lateral compartment is mildly narrowed with no evidence of subchondral cystic changes or osteophytes. The anterior compartment is within normal limits with small osteophytes seen. The patella is well-centered within the trochlear groove. There are no loose bodies appreciated. 12/30/2020  Conclusions        Summary        No evidence of deep vein or superficial vein thrombosis involving the right    lower extremity.    Large avascular area extending from the medial knee down into distal calf    which may represent a hematoma.        Signature        ------------------------------------------------------------------    Electronically signed by Magali Barlow MD (Interpreting    [id number]) on 01/02/2021 at 12:49 PM    ------------------------------------------------------------------       Narrative   Site: "[employer] [facility] Rad #: [id number] #: [id number] Procedure: MR Right Tibia and Fibula w/o Contrast ; Reason for Exam: right calf swelling and pain   This document is confidential medical information.  Unauthorized disclosure or use of this information is prohibited by law. If you are not the intended recipient of this document, please advise us by calling immediately [phone]. [address], [address] [facility]           Patient Name: Rajwinder Riggins   Case ID: [id number]   Patient : 1965   Referring Physician: Parvez Weldon MD   Exam Date: 2021   Exam Description: MR Right Tibia and Fibula w/o Contrast            HISTORY:  Right calf swelling and pain.  Fell 6 weeks ago with hard knot and pain and calf x4    weeks, ankle swelling.       TECHNICAL FACTORS:  STIR sagittal, T2 coronal, T1 axial, sagittal; T2 fat-sat axial images were    performed.       COMPARISON:  None.       FINDINGS:  Elliptical area of high T2 signal extending from the gastrocnemius-semimembranosus    bursa inferiorly along the margin of the medial gastrocnemius muscle, measuring 13.8 cm in    craniocaudal dimension and 3.3 x 3.3 cm transaxial dimensions.  Internal debris evident.     Findings most compatible with a inferiorly prolapsing Baker's cysts, possibly containing    hemorrhage.  Less likely to represent intramuscular hematoma or cyst formation.       Marrow signal within the tibia and fibula unremarkable without occult fracture or stress    fracture.  Neurovascular bundle intact.  Mild edema pretibial space may represent early stress    reaction.       CONCLUSION:   1. Elliptical area of high T2 signal extending from the gastrocnemius-semimembranosus bursa    inferiorly along the margin of the medial gastrocnemius muscle measuring 13.8 cm in    craniocaudal dimension and 3.3 x 3.3 cm in transaxial dimension with internal debris.  Findings    most compatible with prolapsing Baker's cysts, possibly hemorrhagic.  Less likely represent    intramuscular hematoma or cyst formation.           Thank you for the opportunity to provide your interpretation.               Luis Enrique Calle MD       A: NOHEMY 2021 8:30 PM         Assessment & Plan:  63 y.o. female who presents with    Diagnosis Orders   1.  Bakers cyst, right  UT ARTHROCENTESIS ASPIR&/INJ MAJOR JT/BURSA W/O US           Procedures    W[place] ARTHROCENTESIS ASPIR&/INJ MAJOR JT/BURSA W/O US       Thank you Dr Annamaria Green for referring Patience Frederick to me for evaluation of her right leg/calf pain:    Differential - hematoma, large Baker's cyst, ruptured cyst, medial gastroc tear    I have reviewed the MRI images with Patience Shadmary ann, and I agree with the radiologist read  This large cyst appears to originate from the posteromedial knee joint  Cyst located medial to gastroc muscle in subcutaneous position  Debris inferiorly in dependent position    Reviewed treatment options at this time - observation, RICE, possible aspiration, and finally surgical excision    I have recommended initial nonoperative treatment  Patient would like to proceed with cyst aspiration today    Therefore right proximal posteromedial calf cleaned with alcohol and Chloraprep  Skin directly over the large cyst infiltrated with lidocaine  Then the cyst was decompressed/aspirated without difficulty  Total of ~65 mL of old hemorrhagic fluid aspirated, likely indicating traumatic origin and corresponding to patient's history of trauma/fall with symptom onset  Compression ACE wrap then applied    Recommend ice therapy, compression ACE wrap full time for few days, rest/activity modification, elevation.   Will see how she does with this  If symptoms/swelling persists/returns, she is advised to return to the office for further discussion    Christa Tracey

## 2021-02-07 RX ORDER — DESVENLAFAXINE 50 MG/1
TABLET, EXTENDED RELEASE ORAL
Qty: 30 TABLET | Refills: 0 | Status: SHIPPED | OUTPATIENT
Start: 2021-02-07 | End: 2021-03-19

## 2021-03-19 RX ORDER — DESVENLAFAXINE 50 MG/1
TABLET, EXTENDED RELEASE ORAL
Qty: 30 TABLET | Refills: 0 | Status: SHIPPED | OUTPATIENT
Start: 2021-03-19 | End: 2021-04-28

## 2021-04-28 RX ORDER — DESVENLAFAXINE 50 MG/1
TABLET, EXTENDED RELEASE ORAL
Qty: 30 TABLET | Refills: 0 | Status: SHIPPED | OUTPATIENT
Start: 2021-04-28 | End: 2021-06-04

## 2021-06-04 RX ORDER — DESVENLAFAXINE 50 MG/1
TABLET, EXTENDED RELEASE ORAL
Qty: 30 TABLET | Refills: 0 | Status: SHIPPED | OUTPATIENT
Start: 2021-06-04 | End: 2021-07-12

## 2021-07-08 ENCOUNTER — TELEPHONE (OUTPATIENT)
Dept: FAMILY MEDICINE CLINIC | Age: 56
End: 2021-07-08

## 2021-07-08 RX ORDER — METHYLPREDNISOLONE 4 MG/1
TABLET ORAL
Qty: 21 TABLET | Refills: 0 | Status: SHIPPED | OUTPATIENT
Start: 2021-07-08

## 2021-07-08 NOTE — TELEPHONE ENCOUNTER
Patient states she has a red rash on the right side of her body, elbows, and knees. States it is very itchy. States it looks like hives and comes on when she is stressed. States she is under a lot of stress. Patient is requesting an appointment asap or if MD could send a med to her pharmacy. Please return call.

## 2021-07-26 ENCOUNTER — OFFICE VISIT (OUTPATIENT)
Dept: FAMILY MEDICINE CLINIC | Age: 56
End: 2021-07-26
Payer: COMMERCIAL

## 2021-07-26 VITALS
WEIGHT: 153 LBS | BODY MASS INDEX: 21.42 KG/M2 | HEIGHT: 71 IN | SYSTOLIC BLOOD PRESSURE: 112 MMHG | DIASTOLIC BLOOD PRESSURE: 62 MMHG

## 2021-07-26 DIAGNOSIS — L28.0 NEURODERMATITIS: ICD-10-CM

## 2021-07-26 DIAGNOSIS — Z12.31 ENCOUNTER FOR SCREENING MAMMOGRAM FOR MALIGNANT NEOPLASM OF BREAST: ICD-10-CM

## 2021-07-26 DIAGNOSIS — Z00.00 WELL ADULT EXAM: Primary | ICD-10-CM

## 2021-07-26 PROCEDURE — 90715 TDAP VACCINE 7 YRS/> IM: CPT | Performed by: FAMILY MEDICINE

## 2021-07-26 PROCEDURE — 99396 PREV VISIT EST AGE 40-64: CPT | Performed by: FAMILY MEDICINE

## 2021-07-26 PROCEDURE — 90471 IMMUNIZATION ADMIN: CPT | Performed by: FAMILY MEDICINE

## 2021-07-26 ASSESSMENT — PATIENT HEALTH QUESTIONNAIRE - PHQ9
SUM OF ALL RESPONSES TO PHQ QUESTIONS 1-9: 0
SUM OF ALL RESPONSES TO PHQ QUESTIONS 1-9: 0
1. LITTLE INTEREST OR PLEASURE IN DOING THINGS: 0
2. FEELING DOWN, DEPRESSED OR HOPELESS: 0
SUM OF ALL RESPONSES TO PHQ9 QUESTIONS 1 & 2: 0
SUM OF ALL RESPONSES TO PHQ QUESTIONS 1-9: 0

## 2021-07-26 ASSESSMENT — ENCOUNTER SYMPTOMS
RESPIRATORY NEGATIVE: 1
GASTROINTESTINAL NEGATIVE: 1

## 2021-07-26 NOTE — PROGRESS NOTES
OUTPATIENT PROGRESS NOTE  Date of Service:  2021  Address: [facility]  [facility] [address] [facility],First Floor [address]  Dept: [phone]  Loc: [phone]    Subjective:      Patient ID:  <[id number]>  Savanna Casas is a 63 y.o. female     HPI    Well exam  She is stressed   They moved and got a new house to move her parents in because of their declining health  Her mom  and dad had cva  She has health aids in the house all the time  Very stressful  Went on vacation for a week and it was not a good situation at a respite nursing home for her dad  Prince Garcia out in a rash on her right hip and down right leg  Now some on left leg    Review of Systems   Constitutional: Negative. Respiratory: Negative. Cardiovascular: Negative. Gastrointestinal: Negative. Skin: Positive for rash. Neurological: Negative. Objective:   YOB: 1965    Date of Visit:  2021       Allergies   Allergen Reactions    Amoxicillin Hives       Outpatient Medications Marked as Taking for the 21 encounter (Office Visit) with Juan Levine, DO   Medication Sig Dispense Refill    fluocinonide (LIDEX) 0.05 % cream Apply topically 2 times daily. 60 g 1    desvenlafaxine succinate (PRISTIQ) 50 MG TB24 extended release tablet TAKE ONE TABLET BY MOUTH DAILY 30 tablet 2    methylPREDNISolone (MEDROL, LINETTE,) 4 MG tablet Take as directed.  21 tablet 0    Etanercept (ENBREL SC) Inject into the skin      methotrexate Sodium (RHEUMATREX) 250 MG/10ML SOLN chemo injection USE 1 ML SUBCUTANEOUSLY UTD  ONCE A WEEK  3    hydroxychloroquine (PLAQUENIL) 200 MG tablet   3    folic acid (FOLVITE) 1 MG tablet   5    Cholecalciferol (VITAMIN D3) 2000 units CAPS TK 1 C PO D  3    calcium elemental (OSCAL) 500 MG TABS tablet   5    B-D TB SYRINGE 1CC/27GX1/2\" 27G X 1/2\" 1 ML MISC TO BE USED WITH METHOTREXATE  0    venlafaxine (EFFEXOR XR) 37.5 MG extended release capsule TK 1 C PO D         Vitals:    07/26/21 1308   BP: 112/62   Weight: 153 lb (69.4 kg)   Height: 5' 11\" (1.803 m)     Body mass index is 21.34 kg/m². Wt Readings from Last 3 Encounters:   07/26/21 153 lb (69.4 kg)   01/08/21 152 lb (68.9 kg)   01/05/21 150 lb (68 kg)     BP Readings from Last 3 Encounters:   07/26/21 112/62   12/29/20 116/64   08/10/20 102/68       Physical Exam  Vitals and nursing note reviewed. Constitutional:       General: She is not in acute distress. Appearance: She is well-developed. HENT:      Head: Normocephalic. Right Ear: External ear normal.      Left Ear: External ear normal.      Nose: Nose normal.   Eyes:      General:         Left eye: No discharge. Conjunctiva/sclera: Conjunctivae normal.      Pupils: Pupils are equal, round, and reactive to light. Neck:      Thyroid: No thyromegaly. Cardiovascular:      Rate and Rhythm: Normal rate and regular rhythm. Heart sounds: Normal heart sounds. No murmur heard. Pulmonary:      Effort: Pulmonary effort is normal. No respiratory distress. Breath sounds: No wheezing or rales. Abdominal:      General: There is no distension. Palpations: Abdomen is soft. There is no mass. Tenderness: There is no guarding. Musculoskeletal:      Cervical back: Normal range of motion. Lymphadenopathy:      Cervical: No cervical adenopathy. Skin:     General: Skin is warm and dry. Findings: Rash (red raised excoriated lesions on right leg and lower left leg) present. No erythema. Neurological:      Mental Status: She is alert and oriented to person, place, and time. Deep Tendon Reflexes: Reflexes are normal and symmetric. Psychiatric:         Behavior: Behavior normal.         Thought Content: Thought content normal.         Judgment: Judgment normal.            Assessment/Plan          Assessment/plan;  Neil Leiva was seen today for annual exam and rash.     Diagnoses and all orders for this visit:    Well adult exam  -     [facility] DIGITAL SCREEN W OR WO CAD BILATERAL; Future  -     Tdap (age 10y-63y) IM (ADACEL)    Encounter for screening mammogram for malignant neoplasm of breast  -     [facility] DIGITAL SCREEN W OR WO CAD BILATERAL; Future    Neurodermatitis  -     fluocinonide (LIDEX) 0.05 % cream; Apply topically 2 times daily. Return in about 1 year (around 7/26/2022).   Support given  Discussed fmla for caring for her dad and she will consider              Danny Patel, DO

## 2021-07-28 ENCOUNTER — NURSE ONLY (OUTPATIENT)
Dept: FAMILY MEDICINE CLINIC | Age: 56
End: 2021-07-28
Payer: COMMERCIAL

## 2021-07-28 DIAGNOSIS — Z00.00 WELL ADULT EXAM: Primary | ICD-10-CM

## 2021-07-28 LAB
A/G RATIO: 1.8 (ref 1.1–2.2)
ALBUMIN SERPL-MCNC: 4.4 G/DL (ref 3.4–5)
ALP BLD-CCNC: 73 U/L (ref 40–129)
ALT SERPL-CCNC: 15 U/L (ref 10–40)
ANION GAP SERPL CALCULATED.3IONS-SCNC: 11 MMOL/L (ref 3–16)
AST SERPL-CCNC: 21 U/L (ref 15–37)
BILIRUB SERPL-MCNC: 0.7 MG/DL (ref 0–1)
BUN BLDV-MCNC: 8 MG/DL (ref 7–20)
CALCIUM SERPL-MCNC: 9.4 MG/DL (ref 8.3–10.6)
CHLORIDE BLD-SCNC: 106 MMOL/L (ref 99–110)
CHOLESTEROL, FASTING: 144 MG/DL (ref 0–199)
CO2: 27 MMOL/L (ref 21–32)
CREAT SERPL-MCNC: 0.6 MG/DL (ref 0.6–1.1)
GFR AFRICAN AMERICAN: >60
GFR NON-AFRICAN AMERICAN: >60
GLOBULIN: 2.4 G/DL
GLUCOSE BLD-MCNC: 82 MG/DL (ref 70–99)
HCT VFR BLD CALC: 41 % (ref 36–48)
HDLC SERPL-MCNC: 73 MG/DL (ref 40–60)
HEMOGLOBIN: 13.7 G/DL (ref 12–16)
LDL CHOLESTEROL CALCULATED: 62 MG/DL
MCH RBC QN AUTO: 29.6 PG (ref 26–34)
MCHC RBC AUTO-ENTMCNC: 33.3 G/DL (ref 31–36)
MCV RBC AUTO: 89 FL (ref 80–100)
PDW BLD-RTO: 12.4 % (ref 12.4–15.4)
PLATELET # BLD: 208 K/UL (ref 135–450)
PMV BLD AUTO: 8.6 FL (ref 5–10.5)
POTASSIUM SERPL-SCNC: 4.5 MMOL/L (ref 3.5–5.1)
RBC # BLD: 4.61 M/UL (ref 4–5.2)
SODIUM BLD-SCNC: 144 MMOL/L (ref 136–145)
TOTAL PROTEIN: 6.8 G/DL (ref 6.4–8.2)
TRIGLYCERIDE, FASTING: 45 MG/DL (ref 0–150)
TSH SERPL DL<=0.05 MIU/L-ACNC: 1.69 UIU/ML (ref 0.27–4.2)
VLDLC SERPL CALC-MCNC: 9 MG/DL
WBC # BLD: 3.2 K/UL (ref 4–11)

## 2021-07-28 PROCEDURE — 36415 COLL VENOUS BLD VENIPUNCTURE: CPT | Performed by: FAMILY MEDICINE

## 2021-07-30 ENCOUNTER — TELEPHONE (OUTPATIENT)
Dept: FAMILY MEDICINE CLINIC | Age: 56
End: 2021-07-30

## 2021-07-30 NOTE — TELEPHONE ENCOUNTER
All of her lab work looks normal    her thyroid, electrolytes, sugar, kidney function, liver and lipids all normal   Repeat one year

## 2021-07-30 NOTE — TELEPHONE ENCOUNTER
Pt would like a return call with the results from her blood work.  Please give pt a call [phone]

## 2021-08-05 ENCOUNTER — HOSPITAL ENCOUNTER (OUTPATIENT)
Dept: WOMENS IMAGING | Age: 56
Discharge: HOME | End: 2021-08-05
Payer: COMMERCIAL

## 2021-08-05 DIAGNOSIS — Z12.31 ENCOUNTER FOR SCREENING MAMMOGRAM FOR MALIGNANT NEOPLASM OF BREAST: ICD-10-CM

## 2021-08-05 DIAGNOSIS — Z00.00 WELL ADULT EXAM: ICD-10-CM

## 2021-08-05 PROCEDURE — 77063 BREAST TOMOSYNTHESIS BI: CPT

## 2022-04-21 ENCOUNTER — TELEPHONE (OUTPATIENT)
Dept: FAMILY MEDICINE CLINIC | Age: 57
End: 2022-04-21

## 2022-04-21 NOTE — TELEPHONE ENCOUNTER
Patient called stating cough, sneezing, wheezing, nasal congestion, body aches, headache and chills. Patient states symptoms started Saturday. Patient taking OTC cough medication and Advil with no relief.  Patient has not been COVID or flu symptoms since onset of symptoms

## 2022-04-22 ENCOUNTER — TELEPHONE (OUTPATIENT)
Dept: FAMILY MEDICINE CLINIC | Age: 57
End: 2022-04-22

## 2022-04-22 ENCOUNTER — TELEMEDICINE (OUTPATIENT)
Dept: FAMILY MEDICINE CLINIC | Age: 57
End: 2022-04-22
Payer: COMMERCIAL

## 2022-04-22 DIAGNOSIS — J40 BRONCHITIS: Primary | ICD-10-CM

## 2022-04-22 DIAGNOSIS — J40 BRONCHITIS: ICD-10-CM

## 2022-04-22 PROCEDURE — 99213 OFFICE O/P EST LOW 20 MIN: CPT | Performed by: FAMILY MEDICINE

## 2022-04-22 RX ORDER — METHYLPREDNISOLONE 4 MG/1
TABLET ORAL
Qty: 1 KIT | Refills: 0 | Status: SHIPPED | OUTPATIENT
Start: 2022-04-22 | End: 2022-04-28

## 2022-04-22 RX ORDER — DEXTROMETHORPHAN HYDROBROMIDE AND PROMETHAZINE HYDROCHLORIDE 15; 6.25 MG/5ML; MG/5ML
5 SYRUP ORAL 4 TIMES DAILY PRN
Qty: 180 ML | Refills: 0 | Status: SHIPPED | OUTPATIENT
Start: 2022-04-22 | End: 2022-05-02

## 2022-04-22 RX ORDER — AZITHROMYCIN 250 MG/1
250 TABLET, FILM COATED ORAL SEE ADMIN INSTRUCTIONS
Qty: 6 TABLET | Refills: 0 | Status: SHIPPED | OUTPATIENT
Start: 2022-04-22 | End: 2022-04-27

## 2022-04-22 RX ORDER — AZITHROMYCIN 250 MG/1
250 TABLET, FILM COATED ORAL SEE ADMIN INSTRUCTIONS
Qty: 6 TABLET | Refills: 0 | Status: SHIPPED | OUTPATIENT
Start: 2022-04-22 | End: 2022-04-22 | Stop reason: SDUPTHER

## 2022-04-22 RX ORDER — METHYLPREDNISOLONE 4 MG/1
TABLET ORAL
Qty: 1 KIT | Refills: 0 | Status: SHIPPED | OUTPATIENT
Start: 2022-04-22 | End: 2022-04-22 | Stop reason: SDUPTHER

## 2022-04-22 RX ORDER — DEXTROMETHORPHAN HYDROBROMIDE AND PROMETHAZINE HYDROCHLORIDE 15; 6.25 MG/5ML; MG/5ML
5 SYRUP ORAL 4 TIMES DAILY PRN
Qty: 180 ML | Refills: 0 | Status: SHIPPED | OUTPATIENT
Start: 2022-04-22 | End: 2022-04-22 | Stop reason: SDUPTHER

## 2022-04-22 RX ORDER — DEXTROMETHORPHAN HYDROBROMIDE AND PROMETHAZINE HYDROCHLORIDE 15; 6.25 MG/5ML; MG/5ML
5 SYRUP ORAL 4 TIMES DAILY PRN
Qty: 180 ML | Refills: 0 | Status: CANCELLED | OUTPATIENT
Start: 2022-04-22 | End: 2022-05-02

## 2022-04-22 ASSESSMENT — ENCOUNTER SYMPTOMS
RHINORRHEA: 1
COUGH: 1
SHORTNESS OF BREATH: 1

## 2022-04-22 NOTE — PROGRESS NOTES
Called [facility] spoke to pharmacy tech Hazel Vu canceled cough syrup scrip  Resent to correct pharmacy   Pt informed

## 2022-04-22 NOTE — PROGRESS NOTES
Zacarias Guaman (:  1965) is a 61 y.o. female,Established patient, here for evaluation of the following chief complaint(s):  No chief complaint on file. ASSESSMENT/PLAN:  1. Bronchitis  -     azithromycin (ZITHROMAX) 250 MG tablet; Take 1 tablet by mouth See Admin Instructions for 5 days 500mg on day 1 followed by 250mg on days 2 - 5, Disp-6 tablet, R-0Normal  -     methylPREDNISolone (MEDROL DOSEPACK) 4 MG tablet; Take by mouth., Disp-1 kit, R-0Normal  -     promethazine-dextromethorphan (PROMETHAZINE-DM) 6.25-15 MG/5ML syrup; Take 5 mLs by mouth 4 times daily as needed for Cough, Disp-180 mL, R-0Normal       See next week if not improving  Subjective   SUBJECTIVE/OBJECTIVE:  Cough  This is a new problem. The current episode started in the past 7 days. The problem has been gradually worsening. The cough is productive of sputum. Associated symptoms include chest pain, headaches, myalgias, nasal congestion, postnasal drip, rhinorrhea and shortness of breath. She has tried OTC cough suppressant for the symptoms. The treatment provided mild relief. Review of Systems   HENT: Positive for postnasal drip and rhinorrhea. Respiratory: Positive for cough and shortness of breath. Cardiovascular: Positive for chest pain. Musculoskeletal: Positive for myalgias. Neurological: Positive for headaches. Objective   Physical Exam  Constitutional:       General: She is not in acute distress. Appearance: She is well-developed. HENT:      Head: Normocephalic. Neurological:      Mental Status: She is alert and oriented to person, place, and time. Psychiatric:         Behavior: Behavior normal.         Thought Content: Thought content normal.         Judgment: Judgment normal.                  An electronic signature was used to authenticate this note.     --Maureen Melendez DO

## 2022-04-22 NOTE — TELEPHONE ENCOUNTER
Pt called to inquire about her scripts.    Jese Green followed through with pt and scripts are ready to be picked up at [facility]

## 2022-04-22 NOTE — TELEPHONE ENCOUNTER
Please resend following to Theo Lopez on [facility]. Please contact [facility] and request the 3 medications to be cancelled through [facility] so [facility] will fill. Please return patient's call and confirm.

## 2022-04-22 NOTE — TELEPHONE ENCOUNTER
Patient called requesting the prescriptions that were sent to [address] [place] [address] today to be resent to [facility] on [facility]

## 2022-06-06 RX ORDER — DESVENLAFAXINE 50 MG/1
TABLET, EXTENDED RELEASE ORAL
Qty: 30 TABLET | Refills: 2 | Status: SHIPPED | OUTPATIENT
Start: 2022-06-06 | End: 2022-08-02 | Stop reason: SDUPTHER

## 2022-08-02 ENCOUNTER — OFFICE VISIT (OUTPATIENT)
Dept: FAMILY MEDICINE CLINIC | Age: 57
End: 2022-08-02
Payer: COMMERCIAL

## 2022-08-02 VITALS
HEIGHT: 71 IN | WEIGHT: 156 LBS | SYSTOLIC BLOOD PRESSURE: 124 MMHG | BODY MASS INDEX: 21.84 KG/M2 | DIASTOLIC BLOOD PRESSURE: 78 MMHG

## 2022-08-02 DIAGNOSIS — Z00.00 WELL ADULT EXAM: Primary | ICD-10-CM

## 2022-08-02 PROCEDURE — 99396 PREV VISIT EST AGE 40-64: CPT | Performed by: FAMILY MEDICINE

## 2022-08-02 RX ORDER — DESVENLAFAXINE 50 MG/1
TABLET, EXTENDED RELEASE ORAL
Qty: 30 TABLET | Refills: 5 | Status: SHIPPED | OUTPATIENT
Start: 2022-08-02

## 2022-08-02 ASSESSMENT — ENCOUNTER SYMPTOMS
RESPIRATORY NEGATIVE: 1
GASTROINTESTINAL NEGATIVE: 1

## 2022-08-02 ASSESSMENT — PATIENT HEALTH QUESTIONNAIRE - PHQ9
SUM OF ALL RESPONSES TO PHQ9 QUESTIONS 1 & 2: 0
1. LITTLE INTEREST OR PLEASURE IN DOING THINGS: 0
SUM OF ALL RESPONSES TO PHQ QUESTIONS 1-9: 0
SUM OF ALL RESPONSES TO PHQ QUESTIONS 1-9: 0
2. FEELING DOWN, DEPRESSED OR HOPELESS: 0
SUM OF ALL RESPONSES TO PHQ QUESTIONS 1-9: 0
SUM OF ALL RESPONSES TO PHQ QUESTIONS 1-9: 0

## 2022-08-02 NOTE — PROGRESS NOTES
OUTPATIENT PROGRESS NOTE  Date of Service:  8/2/2022  Address: [facility]  [address] [facility]First Floor [address]  Dept: [phone]  Loc: [phone]    Subjective:      Patient ID:  [id number]  Nikolay Valencia is a 61 y.o. 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 Respiratory: Negative. Cardiovascular: Negative. Gastrointestinal: Negative. Skin: Negative. Neurological: Negative. Objective:   YOB: 1965    Date of Visit:  8/2/2022       Allergies   Allergen Reactions    Amoxicillin Hives       Outpatient Medications Marked as Taking for the 8/2/22 encounter (Office Visit) with Melecio Liang, DO   Medication Sig Dispense Refill    desvenlafaxine succinate (PRISTIQ) 50 MG TB24 extended release tablet TAKE ONE TABLET BY MOUTH DAILY 30 tablet 2    fluocinonide (LIDEX) 0.05 % cream Apply topically 2 times daily. 60 g 1    methylPREDNISolone (MEDROL, LINETTE,) 4 MG tablet Take as directed. 21 tablet 0    Etanercept (ENBREL SC) Inject into the skin      Cholecalciferol (VITAMIN D3) 2000 units CAPS TK 1 C PO D  3    calcium elemental (OSCAL) 500 MG TABS tablet   5    B-D TB SYRINGE 1CC/27GX1/2\" 27G X 1/2\" 1 ML MISC TO BE USED WITH METHOTREXATE  0       Vitals:    08/02/22 0931   BP: 124/78   Weight: 156 lb (70.8 kg)   Height: 5' 11\" (1.803 m)     Body mass index is 21.76 kg/m².      Wt Readings from Last 3 Encounters:   08/02/22 156 lb (70.8 kg)   07/26/21 153 lb (69.4 kg)   01/08/21 152 lb (68.9 kg)     BP Readings from Last 3 Encounters:   08/02/22 124/78   07/26/21 112/62   12/29/20 116/64     Allergies   Allergen Reactions    Amoxicillin Hives     Current Outpatient Medications   Medication Sig Dispense Refill    desvenlafaxine succinate (PRISTIQ) 50 MG TB24 extended release tablet TAKE ONE TABLET BY MOUTH DAILY 30 tablet 5    fluocinonide (LIDEX) 0.05 % cream Apply topically 2 times daily. 60 g 1    methylPREDNISolone (MEDROL, LINETTE,) 4 MG tablet Take as directed. 21 tablet 0    Etanercept (ENBREL SC) Inject into the skin      Cholecalciferol (VITAMIN D3) 2000 units CAPS TK 1 C PO D  3    calcium elemental (OSCAL) 500 MG TABS tablet   5    B-D TB SYRINGE 1CC/27GX1/2\" 27G X 1/2\" 1 ML MISC TO BE USED WITH METHOTREXATE  0     No current facility-administered medications for this visit. Past Medical History:   Diagnosis Date    Anxiety     Carpal tunnel syndrome on right     Dermatitis     History of kidney stones     IBS (irritable bowel syndrome)     Rheumatic disease     Wears glasses      Past Surgical History:   Procedure Laterality Date    CHOLECYSTECTOMY  12-    lap    HYSTERECTOMY (CERVIX STATUS UNKNOWN)      LITHOTRIPSY      VEIN SURGERY Left     vein stripping     Social History     Tobacco Use    Smoking status: Never    Smokeless tobacco: Never   Substance Use Topics    Alcohol use: Not Currently     Comment: Socially    Drug use: No     Family History   Problem Relation Age of Onset    Diabetes Mother         type 1 diabetes    Heart Disease Father         mild MI         Physical Exam  Vitals and nursing note reviewed. Constitutional:       General: She is not in acute distress. Appearance: She is well-developed. HENT:      Head: Normocephalic. Right Ear: External ear normal.      Left Ear: External ear normal.      Nose: Nose normal.   Eyes:      General:         Left eye: No discharge. Conjunctiva/sclera: Conjunctivae normal.      Pupils: Pupils are equal, round, and reactive to light. Neck:      Thyroid: No thyromegaly. Cardiovascular:      Rate and Rhythm: Normal rate and regular rhythm. Heart sounds: Normal heart sounds. No murmur heard. Pulmonary:      Effort: Pulmonary effort is normal. No respiratory distress.       Breath sounds: No wheezing or rales. Abdominal:      General: There is no distension. Palpations: Abdomen is soft. There is no mass. Tenderness: There is no guarding. Musculoskeletal:      Cervical back: Normal range of motion. Lymphadenopathy:      Cervical: No cervical adenopathy. Skin:     General: Skin is warm and dry. Findings: No erythema or rash. Neurological:      Mental Status: She is alert and oriented to person, place, and time. Deep Tendon Reflexes: Reflexes are normal and symmetric. Psychiatric:         Behavior: Behavior normal.         Thought Content: Thought content normal.         Judgment: Judgment normal.          Assessment/Plan      Assessment/plan;  Sylvester Wilder was seen today for annual exam and cough. Diagnoses and all orders for this visit:    Well adult exam    Other orders  -     desvenlafaxine succinate (PRISTIQ) 50 MG TB24 extended release tablet; TAKE ONE TABLET BY MOUTH DAILY      Return in about 1 year (around 8/2/2023).      Joyce Snow, DO

## 2022-08-03 ENCOUNTER — NURSE ONLY (OUTPATIENT)
Dept: FAMILY MEDICINE CLINIC | Age: 57
End: 2022-08-03
Payer: COMMERCIAL

## 2022-08-03 DIAGNOSIS — Z00.00 WELL ADULT EXAM: Primary | ICD-10-CM

## 2022-08-03 LAB
A/G RATIO: 2.1 (ref 1.1–2.2)
ALBUMIN SERPL-MCNC: 4.7 G/DL (ref 3.4–5)
ALP BLD-CCNC: 76 U/L (ref 40–129)
ALT SERPL-CCNC: 9 U/L (ref 10–40)
ANION GAP SERPL CALCULATED.3IONS-SCNC: 11 MMOL/L (ref 3–16)
AST SERPL-CCNC: 16 U/L (ref 15–37)
BILIRUB SERPL-MCNC: 1 MG/DL (ref 0–1)
BUN BLDV-MCNC: 17 MG/DL (ref 7–20)
CALCIUM SERPL-MCNC: 9.5 MG/DL (ref 8.3–10.6)
CHLORIDE BLD-SCNC: 106 MMOL/L (ref 99–110)
CHOLESTEROL, TOTAL: 156 MG/DL (ref 0–199)
CO2: 29 MMOL/L (ref 21–32)
CREAT SERPL-MCNC: 0.6 MG/DL (ref 0.6–1.1)
GFR AFRICAN AMERICAN: >60
GFR NON-AFRICAN AMERICAN: >60
GLUCOSE BLD-MCNC: 100 MG/DL (ref 70–99)
HCT VFR BLD CALC: 41.3 % (ref 36–48)
HDLC SERPL-MCNC: 74 MG/DL (ref 40–60)
HEMOGLOBIN: 14 G/DL (ref 12–16)
LDL CHOLESTEROL CALCULATED: 71 MG/DL
MCH RBC QN AUTO: 30.5 PG (ref 26–34)
MCHC RBC AUTO-ENTMCNC: 33.9 G/DL (ref 31–36)
MCV RBC AUTO: 90 FL (ref 80–100)
PDW BLD-RTO: 12.3 % (ref 12.4–15.4)
PLATELET # BLD: 189 K/UL (ref 135–450)
PMV BLD AUTO: 8.6 FL (ref 5–10.5)
POTASSIUM SERPL-SCNC: 4.3 MMOL/L (ref 3.5–5.1)
RBC # BLD: 4.59 M/UL (ref 4–5.2)
SODIUM BLD-SCNC: 146 MMOL/L (ref 136–145)
TOTAL PROTEIN: 6.9 G/DL (ref 6.4–8.2)
TRIGL SERPL-MCNC: 56 MG/DL (ref 0–150)
TSH SERPL DL<=0.05 MIU/L-ACNC: 1.52 UIU/ML (ref 0.27–4.2)
VLDLC SERPL CALC-MCNC: 11 MG/DL
WBC # BLD: 2.7 K/UL (ref 4–11)

## 2022-08-03 PROCEDURE — 36415 COLL VENOUS BLD VENIPUNCTURE: CPT | Performed by: FAMILY MEDICINE

## 2022-09-08 ENCOUNTER — TELEPHONE (OUTPATIENT)
Dept: FAMILY MEDICINE CLINIC | Age: 57
End: 2022-09-08

## 2022-09-08 DIAGNOSIS — K81.1 CHRONIC CHOLECYSTITIS: Primary | ICD-10-CM

## 2022-09-08 NOTE — TELEPHONE ENCOUNTER
----- Message from Jose Pool sent at 9/8/2022 12:05 PM EDT -----  Subject: Referral Request    Reason for referral request? blood work due to white cell count low wanted   to be rechecked in September. Provider patient wants to be referred to(if known):     Provider Phone Number(if known): Additional Information for Provider? Please call to schedule.  Patient   would like to get this completed on 9/9 due to off work.   ---------------------------------------------------------------------------  --------------  [id number] [employer]    [phone]; OK to leave message on voicemail  ---------------------------------------------------------------------------  --------------

## 2022-09-09 ENCOUNTER — TELEPHONE (OUTPATIENT)
Dept: FAMILY MEDICINE CLINIC | Age: 57
End: 2022-09-09

## 2022-09-09 NOTE — TELEPHONE ENCOUNTER
Left VM for patient informing her order was placed for blood work and to call back to schedule a lab appointment. Order placed for CBC.

## 2022-09-12 ENCOUNTER — NURSE ONLY (OUTPATIENT)
Dept: FAMILY MEDICINE CLINIC | Age: 57
End: 2022-09-12
Payer: COMMERCIAL

## 2022-09-12 DIAGNOSIS — K81.1 CHRONIC CHOLECYSTITIS: ICD-10-CM

## 2022-09-12 LAB
HCT VFR BLD CALC: 38.3 % (ref 36–48)
HEMOGLOBIN: 13.4 G/DL (ref 12–16)
MCH RBC QN AUTO: 30.7 PG (ref 26–34)
MCHC RBC AUTO-ENTMCNC: 35 G/DL (ref 31–36)
MCV RBC AUTO: 87.9 FL (ref 80–100)
PDW BLD-RTO: 12.6 % (ref 12.4–15.4)
PLATELET # BLD: 185 K/UL (ref 135–450)
PMV BLD AUTO: 8.6 FL (ref 5–10.5)
RBC # BLD: 4.36 M/UL (ref 4–5.2)
WBC # BLD: 2.9 K/UL (ref 4–11)

## 2022-09-12 PROCEDURE — 36415 COLL VENOUS BLD VENIPUNCTURE: CPT | Performed by: FAMILY MEDICINE

## 2023-09-16 RX ORDER — DESVENLAFAXINE SUCCINATE 50 MG/1
TABLET, EXTENDED RELEASE ORAL
Qty: 30 TABLET | Refills: 5 | Status: SHIPPED | OUTPATIENT
Start: 2023-09-16

## 2023-09-19 ENCOUNTER — OFFICE VISIT (OUTPATIENT)
Dept: FAMILY MEDICINE CLINIC | Age: 58
End: 2023-09-19
Payer: COMMERCIAL

## 2023-09-19 VITALS — HEIGHT: 71 IN | BODY MASS INDEX: 21.84 KG/M2 | WEIGHT: 156 LBS

## 2023-09-19 DIAGNOSIS — L23.7 POISON IVY: Primary | ICD-10-CM

## 2023-09-19 PROCEDURE — 96372 THER/PROPH/DIAG INJ SC/IM: CPT | Performed by: FAMILY MEDICINE

## 2023-09-19 PROCEDURE — 99213 OFFICE O/P EST LOW 20 MIN: CPT | Performed by: FAMILY MEDICINE

## 2023-09-19 RX ORDER — METHYLPREDNISOLONE ACETATE 80 MG/ML
80 INJECTION, SUSPENSION INTRA-ARTICULAR; INTRALESIONAL; INTRAMUSCULAR; SOFT TISSUE ONCE
Status: COMPLETED | OUTPATIENT
Start: 2023-09-19 | End: 2023-09-19

## 2023-09-19 RX ORDER — PREDNISONE 10 MG/1
10 TABLET ORAL DAILY
Qty: 18 TABLET | Refills: 0 | Status: SHIPPED | OUTPATIENT
Start: 2023-09-19 | End: 2023-09-21

## 2023-09-19 RX ADMIN — METHYLPREDNISOLONE ACETATE 80 MG: 80 INJECTION, SUSPENSION INTRA-ARTICULAR; INTRALESIONAL; INTRAMUSCULAR; SOFT TISSUE at 15:10

## 2023-09-19 SDOH — ECONOMIC STABILITY: INCOME INSECURITY: HOW HARD IS IT FOR YOU TO PAY FOR THE VERY BASICS LIKE FOOD, HOUSING, MEDICAL CARE, AND HEATING?: NOT HARD AT ALL

## 2023-09-19 SDOH — ECONOMIC STABILITY: HOUSING INSECURITY
IN THE LAST 12 MONTHS, WAS THERE A TIME WHEN YOU DID NOT HAVE A STEADY PLACE TO SLEEP OR SLEPT IN A SHELTER (INCLUDING NOW)?: NO

## 2023-09-19 SDOH — ECONOMIC STABILITY: FOOD INSECURITY: WITHIN THE PAST 12 MONTHS, YOU WORRIED THAT YOUR FOOD WOULD RUN OUT BEFORE YOU GOT MONEY TO BUY MORE.: NEVER TRUE

## 2023-09-19 SDOH — ECONOMIC STABILITY: FOOD INSECURITY: WITHIN THE PAST 12 MONTHS, THE FOOD YOU BOUGHT JUST DIDN'T LAST AND YOU DIDN'T HAVE MONEY TO GET MORE.: NEVER TRUE

## 2023-09-19 ASSESSMENT — ENCOUNTER SYMPTOMS
DIARRHEA: 0
VOMITING: 0
COUGH: 0
SORE THROAT: 0
SHORTNESS OF BREATH: 0
EYE PAIN: 0
RHINORRHEA: 0
NAIL CHANGES: 0

## 2023-09-19 ASSESSMENT — PATIENT HEALTH QUESTIONNAIRE - PHQ9
2. FEELING DOWN, DEPRESSED OR HOPELESS: 0
SUM OF ALL RESPONSES TO PHQ QUESTIONS 1-9: 0
SUM OF ALL RESPONSES TO PHQ9 QUESTIONS 1 & 2: 0
SUM OF ALL RESPONSES TO PHQ QUESTIONS 1-9: 0
1. LITTLE INTEREST OR PLEASURE IN DOING THINGS: 0

## 2023-09-21 ENCOUNTER — TELEPHONE (OUTPATIENT)
Dept: FAMILY MEDICINE CLINIC | Age: 58
End: 2023-09-21

## 2023-09-21 RX ORDER — PREDNISONE 10 MG/1
TABLET ORAL
Qty: 30 TABLET | Refills: 0 | Status: SHIPPED | OUTPATIENT
Start: 2023-09-21 | End: 2023-10-03

## 2023-09-21 NOTE — TELEPHONE ENCOUNTER
Pt called stating the shot, prednisone, and ointment that was prescribed for her poison ivy has not helped. She stated it's continued to spread down to her legs, and is very painful. The ointment helps relieve the itching, but she ran out of it after two days, and had to get another tube, not covered by insurance. She wants to know if there is something stronger she can take to help.

## 2023-09-21 NOTE — TELEPHONE ENCOUNTER
We will need to get more aggressive with the Prednisone. I have sent a new Rx for higher dose prednisone to [facility]. She should now start taking the new Rx and stop taking the current Rx for Prednisone.

## 2023-09-22 DIAGNOSIS — L23.7 POISON IVY: ICD-10-CM

## 2023-09-24 ENCOUNTER — TELEPHONE (OUTPATIENT)
Dept: PRIMARY CARE CLINIC | Age: 58
End: 2023-09-24

## 2023-09-24 ENCOUNTER — OFFICE VISIT (OUTPATIENT)
Age: 58
End: 2023-09-24

## 2023-09-24 VITALS
DIASTOLIC BLOOD PRESSURE: 71 MMHG | SYSTOLIC BLOOD PRESSURE: 111 MMHG | WEIGHT: 136.4 LBS | HEIGHT: 70 IN | HEART RATE: 63 BPM | OXYGEN SATURATION: 98 % | TEMPERATURE: 98.1 F | BODY MASS INDEX: 19.53 KG/M2

## 2023-09-24 DIAGNOSIS — L23.7 ALLERGIC CONTACT DERMATITIS DUE TO PLANT: Primary | ICD-10-CM

## 2023-09-24 RX ORDER — FAMOTIDINE 20 MG/1
20 TABLET, FILM COATED ORAL 2 TIMES DAILY
Qty: 20 TABLET | Refills: 0 | Status: SHIPPED | OUTPATIENT
Start: 2023-09-24 | End: 2023-09-24

## 2023-09-24 RX ORDER — DEXAMETHASONE 4 MG/1
TABLET ORAL
Qty: 21 TABLET | Refills: 0 | Status: SHIPPED | OUTPATIENT
Start: 2023-09-24

## 2023-09-24 RX ORDER — CETIRIZINE HYDROCHLORIDE 10 MG/1
10 TABLET ORAL NIGHTLY
Qty: 30 TABLET | Refills: 0 | Status: SHIPPED | OUTPATIENT
Start: 2023-09-24 | End: 2023-09-24

## 2023-09-24 RX ORDER — DEXAMETHASONE SODIUM PHOSPHATE 4 MG/ML
8 INJECTION, SOLUTION INTRA-ARTICULAR; INTRALESIONAL; INTRAMUSCULAR; INTRAVENOUS; SOFT TISSUE ONCE
Status: COMPLETED | OUTPATIENT
Start: 2023-09-24 | End: 2023-09-24

## 2023-09-24 RX ADMIN — DEXAMETHASONE SODIUM PHOSPHATE 8 MG: 4 INJECTION, SOLUTION INTRA-ARTICULAR; INTRALESIONAL; INTRAMUSCULAR; INTRAVENOUS; SOFT TISSUE at 11:30

## 2023-09-24 ASSESSMENT — ENCOUNTER SYMPTOMS
RHINORRHEA: 0
COUGH: 0
SHORTNESS OF BREATH: 0

## 2023-09-24 NOTE — TELEPHONE ENCOUNTER
Patient calling regarding worsening poison ivy. Received steroid injection,ointment, and steroid pack which has been ineffective. Patient reports that rash has affected entire body, particularly her neck. Neck has become progressively swollen, painful, red. She states that is it painful to put clothes on. She denies fever,chills, chest pain, sob. Denies hx of shingles. Advised pt to take 600-800mg ibuprofen and directed pt for evaluation and treatment at [facility] to r/o superimposed bacterial infection or other condition . I will send Zyrtec and Famotidine to pharmacy. Patient verbalized understanding and will follow up with PCP at discharge.

## 2023-09-28 ENCOUNTER — HOSPITAL ENCOUNTER (EMERGENCY)
Age: 58
Discharge: HOME | End: 2023-09-28
Attending: EMERGENCY MEDICINE
Payer: COMMERCIAL

## 2023-09-28 VITALS
BODY MASS INDEX: 22.04 KG/M2 | RESPIRATION RATE: 15 BRPM | OXYGEN SATURATION: 98 % | HEART RATE: 77 BPM | WEIGHT: 157.41 LBS | HEIGHT: 71 IN | TEMPERATURE: 97.9 F | DIASTOLIC BLOOD PRESSURE: 71 MMHG | SYSTOLIC BLOOD PRESSURE: 116 MMHG

## 2023-09-28 DIAGNOSIS — R21 RASH AND OTHER NONSPECIFIC SKIN ERUPTION: Primary | ICD-10-CM

## 2023-09-28 PROCEDURE — 99282 EMERGENCY DEPT VISIT SF MDM: CPT

## 2023-09-28 ASSESSMENT — LIFESTYLE VARIABLES
HOW MANY STANDARD DRINKS CONTAINING ALCOHOL DO YOU HAVE ON A TYPICAL DAY: PATIENT DOES NOT DRINK
HOW OFTEN DO YOU HAVE A DRINK CONTAINING ALCOHOL: NEVER

## 2023-09-28 ASSESSMENT — PAIN - FUNCTIONAL ASSESSMENT
PAIN_FUNCTIONAL_ASSESSMENT: 0-10
PAIN_FUNCTIONAL_ASSESSMENT: NONE - DENIES PAIN

## 2023-09-28 ASSESSMENT — PAIN DESCRIPTION - DESCRIPTORS: DESCRIPTORS: ITCHING;DISCOMFORT

## 2023-09-28 ASSESSMENT — PAIN DESCRIPTION - LOCATION: LOCATION: GENERALIZED

## 2023-09-29 NOTE — ED NOTES
Pt discharged home in stable condition. Vitals stable and no iv in place. Discharge paperwork reviewed and verbally understood by patient at this time. Ambulatory.  Ok for MESERET Walls  09/28/23 5199

## 2023-09-29 NOTE — DISCHARGE INSTRUCTIONS
Finish the previously prescribed steroid prescription, as directed. You may use Benadryl, over-the-counter as directed on the package, for itching. Calamine lotion.

## 2023-09-29 NOTE — ED TRIAGE NOTES
Pt states that she contracted poison ivy 2 weeks ago. Pt states that she saw her PCP and was given a shot and prednisone. Pt states that those did not help, so she went to urgent care where they gave her another shot and a prescription for dexamethasone. Pt states that rash has continued to get worse. Pt denies any difficulty breathing.

## 2023-09-30 NOTE — ED PROVIDER NOTES
[address] [facility] EMERGENCY DEPARTMENT    CHIEF COMPLAINT  Allergic Reaction (Pt states that she contracted poison ivy 2 weeks ago. Pt states that she saw her PCP and was given a shot and prednisone. Pt states that those did not help, so she went to urgent care where they gave her another shot and a prescription for dexamethasone. Pt states that rash has continued to get worse. Pt denies any difficulty breathing. )       HISTORY OF PRESENT ILLNESS  Brynn Tyler is a 61 y.o. female who presents to the ED with complaint of rash. Patient was gardening 2 weeks ago. She thinks she may have, to contact with poison ivy. She developed a rash. She was seen by her PCP on 9/19/2023. She was prescribed a prednisone taper, 30 mg x 3 days then 20 mg x 3 days then 10 mg x 3 days. She was also given a Depo-Medrol injection, 80 mg. Was also prescribed fluocinonide cream.  Symptoms seem to worsen by 9/21/2023 and PCP increased the prednisone dosing. By 9/24 the symptoms continue to worsen. Patient was seen at urgent care at which time she was administered a Decadron injection and prescribed a Decadron taper. She has not had much improvement in symptoms since and therefore presents for further evaluation. She denies any fever, purulent discharge, shortness of breath, or trouble swallowing. The rash does not involve the mucous membranes.     I have reviewed the following from the nursing documentation:    Past Medical History:   Diagnosis Date    Anxiety     Carpal tunnel syndrome on right     Dermatitis     History of kidney stones     IBS (irritable bowel syndrome)     Rheumatic disease     Wears glasses      Past Surgical History:   Procedure Laterality Date    CHOLECYSTECTOMY  12-    lap    HYSTERECTOMY (CERVIX STATUS UNKNOWN)      LITHOTRIPSY      VEIN SURGERY Left     vein stripping     Family History   Problem Relation Age of Onset    Diabetes Mother         type 1 diabetes    Heart Disease Father

## 2023-10-02 RX ORDER — FAMOTIDINE 20 MG/1
TABLET, FILM COATED ORAL
Qty: 20 TABLET | Refills: 0 | Status: SHIPPED | OUTPATIENT
Start: 2023-10-02

## 2024-04-24 ENCOUNTER — TELEPHONE (OUTPATIENT)
Dept: FAMILY MEDICINE CLINIC | Age: 59
End: 2024-04-24

## 2024-04-24 NOTE — TELEPHONE ENCOUNTER
Spoke with patient and offered 145- she stated she broke down and went to [facility] clinic who dx her with pink eye in both eye. Advised her to call us if symptoms do not improve after medication.

## 2024-04-24 NOTE — TELEPHONE ENCOUNTER
Patient called stating cough, nasal drainage, nasal congestion, eye swelling and scratchy throat since Friday. Offered patient available appointment today at  3:15 but unable due to her  having oncology appointment at the same time. Patient requesting a return call if any appointments open up sooner or if she can be added in

## 2024-06-27 ENCOUNTER — HOSPITAL ENCOUNTER (OUTPATIENT)
Dept: MAMMOGRAPHY | Age: 59
Discharge: HOME | End: 2024-06-27
Payer: COMMERCIAL

## 2024-06-27 VITALS — HEIGHT: 71 IN | BODY MASS INDEX: 21.7 KG/M2 | WEIGHT: 155 LBS

## 2024-06-27 DIAGNOSIS — Z12.31 VISIT FOR SCREENING MAMMOGRAM: ICD-10-CM

## 2024-06-27 PROCEDURE — 77063 BREAST TOMOSYNTHESIS BI: CPT

## 2024-08-27 ENCOUNTER — TELEPHONE (OUTPATIENT)
Dept: FAMILY MEDICINE CLINIC | Age: 59
End: 2024-08-27

## 2024-08-27 ENCOUNTER — OFFICE VISIT (OUTPATIENT)
Dept: FAMILY MEDICINE CLINIC | Age: 59
End: 2024-08-27
Payer: COMMERCIAL

## 2024-08-27 VITALS
SYSTOLIC BLOOD PRESSURE: 110 MMHG | BODY MASS INDEX: 22.76 KG/M2 | HEIGHT: 71 IN | DIASTOLIC BLOOD PRESSURE: 62 MMHG | WEIGHT: 162.6 LBS

## 2024-08-27 DIAGNOSIS — H60.8X3 CHRONIC ECZEMATOUS OTITIS EXTERNA OF BOTH EARS: Primary | ICD-10-CM

## 2024-08-27 DIAGNOSIS — F33.0 MAJOR DEPRESSIVE DISORDER, RECURRENT, MILD (HCC): ICD-10-CM

## 2024-08-27 PROCEDURE — 99213 OFFICE O/P EST LOW 20 MIN: CPT | Performed by: FAMILY MEDICINE

## 2024-08-27 RX ORDER — NEOMYCIN SULFATE, POLYMYXIN B SULFATE, HYDROCORTISONE 3.5; 10000; 1 MG/ML; [USP'U]/ML; MG/ML
4 SOLUTION/ DROPS AURICULAR (OTIC) 3 TIMES DAILY
Qty: 10 ML | Refills: 0 | Status: SHIPPED | OUTPATIENT
Start: 2024-08-27 | End: 2024-09-01

## 2024-08-27 ASSESSMENT — PATIENT HEALTH QUESTIONNAIRE - PHQ9
SUM OF ALL RESPONSES TO PHQ QUESTIONS 1-9: 0
SUM OF ALL RESPONSES TO PHQ QUESTIONS 1-9: 0
SUM OF ALL RESPONSES TO PHQ9 QUESTIONS 1 & 2: 0
SUM OF ALL RESPONSES TO PHQ QUESTIONS 1-9: 0
1. LITTLE INTEREST OR PLEASURE IN DOING THINGS: NOT AT ALL
2. FEELING DOWN, DEPRESSED OR HOPELESS: NOT AT ALL
SUM OF ALL RESPONSES TO PHQ QUESTIONS 1-9: 0

## 2024-08-27 ASSESSMENT — ENCOUNTER SYMPTOMS
GASTROINTESTINAL NEGATIVE: 1
RESPIRATORY NEGATIVE: 1

## 2024-08-27 NOTE — PROGRESS NOTES
OUTPATIENT PROGRESS NOTE  Date of Service:  8/27/2024  Address: [facility]  [facility]  [address]  [address]  [place] [address]  Dept: [phone]  Loc: [phone]    Subjective:      Patient ID:  [id number]  Jennifer Hayes is a 58 y.o. female     Ear Drainage   Associated symptoms include ear discharge.     Drainage from left ear  Felt irritated and itchy   She put some of her eczema ointment in and this did help but still has some fluid drainage from her ear  Not painful    Her  has been diagnosed with lymphoma  undergoing last round of treatment now    Review of Systems   Constitutional: Negative.    HENT:  Positive for ear discharge.    Respiratory: Negative.     Cardiovascular: Negative.    Gastrointestinal: Negative.    Neurological: Negative.    Psychiatric/Behavioral:  Positive for decreased concentration.        Objective:   YOB: 1965    Date of Visit:  8/27/2024       Allergies   Allergen Reactions    Amoxicillin Hives       Outpatient Medications Marked as Taking for the 8/27/24 encounter (Office Visit) with Sukhjinder Wells,    Medication Sig Dispense Refill    neomycin-polymyxin-hydrocortisone (CORTISPORIN) 3.5-07300-7 otic solution Place 4 drops into both ears 3 times daily for 5 days 10 mL 0    famotidine (PEPCID) 20 MG tablet TAKE 1 TABLET BY MOUTH TWICE DAILY FOR 10 DAYS 20 tablet 0    dexamethasone (DECADRON) 4 MG tablet Take 1 tablet twice daily with meals x7 days, then once daily with meals x7 days. 21 tablet 0    desvenlafaxine succinate (PRISTIQ) 50 MG TB24 extended release tablet TAKE 1 TABLET BY MOUTH DAILY 30 tablet 5       Vitals:    08/27/24 1441   BP: 110/62   Weight: 73.8 kg (162 lb 9.6 oz)   Height: 1.803 m (5' 11\")     Body mass index is 22.68 kg/m².     Wt Readings from Last 3 Encounters:   08/27/24 73.8 kg (162 lb 9.6 oz)   06/27/24 70.3 kg (155 lb)   09/28/23 71.4 kg (157 lb 6.5 oz)     BP Readings  from Last 3 Encounters:   08/27/24 110/62   09/28/23 116/71   09/24/23 111/71       Physical Exam  Constitutional:       General: She is not in acute distress.     Appearance: She is well-developed.   HENT:      Head: Normocephalic.      Ears:      Comments: Left eac with some dry scaly red skin   tm normal  Right eac with some mild irritation     Nose: Nose normal.   Eyes:      Pupils: Pupils are equal, round, and reactive to light.      Comments: Right upper lid with stye   Neurological:      Mental Status: She is alert and oriented to person, place, and time.   Psychiatric:         Behavior: Behavior normal.         Thought Content: Thought content normal.         Judgment: Judgment normal.            Assessment/Plan   Assessment & Plan    Jennifer was seen today for ear drainage and stye.    Diagnoses and all orders for this visit:    Chronic eczematous otitis externa of both ears    Major depressive disorder, recurrent, mild    Other orders  -     neomycin-polymyxin-hydrocortisone (CORTISPORIN) 3.5-87285-8 otic solution; Place 4 drops into both ears 3 times daily for 5 days      No follow-ups on file.                    JEFFREY CASILLAS, DO

## 2024-08-27 NOTE — TELEPHONE ENCOUNTER
Pt called and would like to come in to office.  She is having drainage in her ear for several days and has a sty on her eye  [phone]

## 2024-11-21 RX ORDER — DESVENLAFAXINE 50 MG/1
TABLET, FILM COATED, EXTENDED RELEASE ORAL
Qty: 30 TABLET | Refills: 5 | Status: SHIPPED | OUTPATIENT
Start: 2024-11-21

## 2024-11-21 NOTE — TELEPHONE ENCOUNTER
Last office visit 8/27/2024       Next office visit scheduled Visit date not found    Requested Prescriptions     Pending Prescriptions Disp Refills    desvenlafaxine succinate (PRISTIQ) 50 MG TB24 extended release tablet [Pharmacy Med Name: DESVENLAFAXINE ER SUCCINATE 50MG T] 30 tablet 5     Sig: TAKE 1 TABLET BY MOUTH DAILY

## 2025-04-21 ENCOUNTER — RESULTS FOLLOW-UP (OUTPATIENT)
Dept: FAMILY MEDICINE CLINIC | Age: 60
End: 2025-04-21

## 2025-04-21 ENCOUNTER — OFFICE VISIT (OUTPATIENT)
Dept: FAMILY MEDICINE CLINIC | Age: 60
End: 2025-04-21
Payer: COMMERCIAL

## 2025-04-21 VITALS
SYSTOLIC BLOOD PRESSURE: 110 MMHG | WEIGHT: 160 LBS | DIASTOLIC BLOOD PRESSURE: 62 MMHG | BODY MASS INDEX: 22.4 KG/M2 | HEIGHT: 71 IN

## 2025-04-21 DIAGNOSIS — R59.0 AXILLARY LYMPHADENOPATHY: Primary | ICD-10-CM

## 2025-04-21 LAB
CRP SERPL-MCNC: <3 MG/L (ref 0–5.1)
DEPRECATED RDW RBC AUTO: 12.6 % (ref 12.4–15.4)
ERYTHROCYTE [SEDIMENTATION RATE] IN BLOOD BY WESTERGREN METHOD: 10 MM/HR (ref 0–30)
HCT VFR BLD AUTO: 39.9 % (ref 36–48)
HGB BLD-MCNC: 13.9 G/DL (ref 12–16)
MCH RBC QN AUTO: 30.3 PG (ref 26–34)
MCHC RBC AUTO-ENTMCNC: 34.8 G/DL (ref 31–36)
MCV RBC AUTO: 87 FL (ref 80–100)
PLATELET # BLD AUTO: 217 K/UL (ref 135–450)
PMV BLD AUTO: 8.6 FL (ref 5–10.5)
RBC # BLD AUTO: 4.58 M/UL (ref 4–5.2)
WBC # BLD AUTO: 3.7 K/UL (ref 4–11)

## 2025-04-21 PROCEDURE — 99213 OFFICE O/P EST LOW 20 MIN: CPT | Performed by: FAMILY MEDICINE

## 2025-04-21 RX ORDER — MEDROXYPROGESTERONE ACETATE 150 MG/ML
INJECTION, SUSPENSION INTRAMUSCULAR
COMMUNITY
Start: 2025-04-15

## 2025-04-21 RX ORDER — CLINDAMYCIN HYDROCHLORIDE 300 MG/1
300 CAPSULE ORAL 3 TIMES DAILY
Qty: 21 CAPSULE | Refills: 0 | Status: SHIPPED | OUTPATIENT
Start: 2025-04-21 | End: 2025-04-28

## 2025-04-21 SDOH — ECONOMIC STABILITY: FOOD INSECURITY: WITHIN THE PAST 12 MONTHS, YOU WORRIED THAT YOUR FOOD WOULD RUN OUT BEFORE YOU GOT MONEY TO BUY MORE.: NEVER TRUE

## 2025-04-21 SDOH — ECONOMIC STABILITY: FOOD INSECURITY: WITHIN THE PAST 12 MONTHS, THE FOOD YOU BOUGHT JUST DIDN'T LAST AND YOU DIDN'T HAVE MONEY TO GET MORE.: NEVER TRUE

## 2025-04-21 ASSESSMENT — PATIENT HEALTH QUESTIONNAIRE - PHQ9
4. FEELING TIRED OR HAVING LITTLE ENERGY: NOT AT ALL
SUM OF ALL RESPONSES TO PHQ QUESTIONS 1-9: 0
7. TROUBLE CONCENTRATING ON THINGS, SUCH AS READING THE NEWSPAPER OR WATCHING TELEVISION: NOT AT ALL
SUM OF ALL RESPONSES TO PHQ QUESTIONS 1-9: 0
2. FEELING DOWN, DEPRESSED OR HOPELESS: NOT AT ALL
9. THOUGHTS THAT YOU WOULD BE BETTER OFF DEAD, OR OF HURTING YOURSELF: NOT AT ALL
SUM OF ALL RESPONSES TO PHQ QUESTIONS 1-9: 0
10. IF YOU CHECKED OFF ANY PROBLEMS, HOW DIFFICULT HAVE THESE PROBLEMS MADE IT FOR YOU TO DO YOUR WORK, TAKE CARE OF THINGS AT HOME, OR GET ALONG WITH OTHER PEOPLE: NOT DIFFICULT AT ALL
1. LITTLE INTEREST OR PLEASURE IN DOING THINGS: NOT AT ALL
3. TROUBLE FALLING OR STAYING ASLEEP: NOT AT ALL
6. FEELING BAD ABOUT YOURSELF - OR THAT YOU ARE A FAILURE OR HAVE LET YOURSELF OR YOUR FAMILY DOWN: NOT AT ALL
SUM OF ALL RESPONSES TO PHQ QUESTIONS 1-9: 0
8. MOVING OR SPEAKING SO SLOWLY THAT OTHER PEOPLE COULD HAVE NOTICED. OR THE OPPOSITE, BEING SO FIGETY OR RESTLESS THAT YOU HAVE BEEN MOVING AROUND A LOT MORE THAN USUAL: NOT AT ALL
5. POOR APPETITE OR OVEREATING: NOT AT ALL

## 2025-04-21 ASSESSMENT — ENCOUNTER SYMPTOMS
GASTROINTESTINAL NEGATIVE: 1
RESPIRATORY NEGATIVE: 1

## 2025-04-21 NOTE — PROGRESS NOTES
YOB: 1965    Date of Visit:  2025    Allergies   Allergen Reactions    Amoxicillin Hives       Outpatient Medications Marked as Taking for the 25 encounter (Office Visit) with Sukhjinder Wells DO   Medication Sig Dispense Refill    ENBREL SURECLICK 50 MG/ML SOAJ INJECT 1 PEN SUBCUTANEOUSLY  WEEKLY         Vitals:    25 1439   BP: 110/62   Weight: 72.6 kg (160 lb)   Height: 1.803 m (5' 11\")     Body mass index is 22.32 kg/m².     Wt Readings from Last 3 Encounters:   25 72.6 kg (160 lb)   24 73.8 kg (162 lb 9.6 oz)   24 70.3 kg (155 lb)     BP Readings from Last 3 Encounters:   25 110/62   24 110/62   23 116/71         Jennifer Hayes (:  1965) is a 59 y.o. female,Established patient, here for evaluation of the following chief complaint(s):  Mass (Right arm pit lump )         Assessment & Plan  Axillary lymphadenopathy   Await lab and mammogram     Orders:    CBC    C-Reactive Protein    Sedimentation Rate    clindamycin (CLEOCIN) 300 MG capsule; Take 1 capsule by mouth 3 times daily for 7 days    [facility] AUGMENTED BILATERAL; Future      No follow-ups on file.       Subjective   Mass      Lumps in right axilla  Started with one lump now three  Some erythema over the lump   Not itchy   Tender   No weight loss or night sweats    Review of Systems   Constitutional: Negative.    HENT: Negative.     Respiratory: Negative.     Cardiovascular: Negative.    Gastrointestinal: Negative.    Neurological: Negative.    Psychiatric/Behavioral:  Positive for decreased concentration.           Objective   Physical Exam  Constitutional:       General: She is not in acute distress.     Appearance: She is well-developed.   HENT:      Head: Normocephalic.   Skin:     Comments: Right axilla with three subcutaneous masses  pea size   Neurological:      Mental Status: She is alert and oriented to person, place, and time.   Psychiatric:

## 2025-06-25 ENCOUNTER — TELEPHONE (OUTPATIENT)
Dept: FAMILY MEDICINE CLINIC | Age: 60
End: 2025-06-25

## 2025-06-25 DIAGNOSIS — R92.8 ABNORMAL MAMMOGRAM: Primary | ICD-10-CM

## 2025-06-25 NOTE — TELEPHONE ENCOUNTER
Kelly from [facility] called ([phone]) and said she needs an ultrasound order to accompany the diagnostic mammogram order

## 2025-07-02 ENCOUNTER — RESULTS FOLLOW-UP (OUTPATIENT)
Dept: FAMILY MEDICINE CLINIC | Age: 60
End: 2025-07-02

## 2025-07-02 ENCOUNTER — HOSPITAL ENCOUNTER (OUTPATIENT)
Dept: WOMENS IMAGING | Age: 60
Discharge: HOME | End: 2025-07-02
Payer: COMMERCIAL

## 2025-07-02 ENCOUNTER — HOSPITAL ENCOUNTER (OUTPATIENT)
Dept: ULTRASOUND IMAGING | Age: 60
Discharge: HOME | End: 2025-07-02
Payer: COMMERCIAL

## 2025-07-02 VITALS — BODY MASS INDEX: 22.4 KG/M2 | WEIGHT: 160 LBS | HEIGHT: 71 IN

## 2025-07-02 DIAGNOSIS — R59.0 AXILLARY LYMPHADENOPATHY: ICD-10-CM

## 2025-07-02 DIAGNOSIS — R22.31 LUMP IN ARMPIT, RIGHT: ICD-10-CM

## 2025-07-02 PROCEDURE — 76882 US LMTD JT/FCL EVL NVASC XTR: CPT

## 2025-07-02 PROCEDURE — G0279 TOMOSYNTHESIS, MAMMO: HCPCS
